# Patient Record
Sex: MALE | Race: WHITE | NOT HISPANIC OR LATINO | Employment: OTHER | URBAN - METROPOLITAN AREA
[De-identification: names, ages, dates, MRNs, and addresses within clinical notes are randomized per-mention and may not be internally consistent; named-entity substitution may affect disease eponyms.]

---

## 2022-07-20 ENCOUNTER — OFFICE VISIT (OUTPATIENT)
Dept: FAMILY MEDICINE CLINIC | Facility: CLINIC | Age: 77
End: 2022-07-20

## 2022-07-20 VITALS
HEART RATE: 67 BPM | OXYGEN SATURATION: 97 % | TEMPERATURE: 98.4 F | DIASTOLIC BLOOD PRESSURE: 70 MMHG | BODY MASS INDEX: 28.88 KG/M2 | HEIGHT: 67 IN | SYSTOLIC BLOOD PRESSURE: 120 MMHG | WEIGHT: 184 LBS

## 2022-07-20 DIAGNOSIS — G89.29 CHRONIC PAIN OF BOTH SHOULDERS: Primary | ICD-10-CM

## 2022-07-20 DIAGNOSIS — M54.50 CHRONIC BILATERAL LOW BACK PAIN WITHOUT SCIATICA: ICD-10-CM

## 2022-07-20 DIAGNOSIS — M25.512 CHRONIC PAIN OF BOTH SHOULDERS: Primary | ICD-10-CM

## 2022-07-20 DIAGNOSIS — M25.511 CHRONIC PAIN OF BOTH SHOULDERS: Primary | ICD-10-CM

## 2022-07-20 DIAGNOSIS — G89.29 CHRONIC BILATERAL LOW BACK PAIN WITHOUT SCIATICA: ICD-10-CM

## 2022-07-20 PROBLEM — E55.9 VITAMIN D DEFICIENCY: Status: ACTIVE | Noted: 2021-06-17

## 2022-07-20 PROCEDURE — 99203 OFFICE O/P NEW LOW 30 MIN: CPT | Performed by: NURSE PRACTITIONER

## 2022-07-20 RX ORDER — MONTELUKAST SODIUM 10 MG/1
TABLET ORAL
COMMUNITY

## 2022-07-20 RX ORDER — FLUTICASONE PROPIONATE AND SALMETEROL 250; 50 UG/1; UG/1
POWDER RESPIRATORY (INHALATION)
COMMUNITY

## 2022-07-20 RX ORDER — DIPHENOXYLATE HYDROCHLORIDE AND ATROPINE SULFATE 2.5; .025 MG/1; MG/1
TABLET ORAL
COMMUNITY

## 2022-07-20 RX ORDER — FLUTICASONE PROPIONATE 50 MCG
SPRAY, SUSPENSION (ML) NASAL
COMMUNITY

## 2022-07-20 RX ORDER — OMEPRAZOLE 20 MG/1
CAPSULE, DELAYED RELEASE ORAL
COMMUNITY
Start: 2022-05-09 | End: 2022-11-14 | Stop reason: SDUPTHER

## 2022-07-20 RX ORDER — MULTIPLE VITAMINS W/ MINERALS TAB 9MG-400MCG
1 TAB ORAL DAILY
COMMUNITY

## 2022-07-20 RX ORDER — ALBUTEROL SULFATE 90 UG/1
AEROSOL, METERED RESPIRATORY (INHALATION) EVERY 4 HOURS
COMMUNITY
End: 2022-11-21

## 2022-07-20 NOTE — PROGRESS NOTES
"Chief Complaint  Establish Care (Pt states he was previously going to [facility].) and Shoulder Pain (Pt states he's had pain in both shoulders for about 4 months. Pt states he's had xrays in the past and was told it is arthritis but that was years ago. )    Subjective        Varghese Smith presents to [facility]  Pt is here today to establish care. He is experiencing shoulder pain bilaterally. He thinks he has arthritis. He would like a referral to see someone. He states he had x-rays in the past. This was was [facility] and in the fall of last year. He was advised that he had arthritis. He states that pain is bad enough to wake him up at night. It is worse on the right, but is starting to get worse on the left. She takes an Advil nighttime and acetaminophen at night to help relieve the pain. Pt states he is very active. He reports that he was playing tennis at the beginning of the year, but is not able to do that anymore. He walks daily.     Objective   Vital Signs:  /70   Pulse 67   Temp 98.4 °F (36.9 °C)   Ht 170.2 cm (67\")   Wt 83.5 kg (184 lb)   SpO2 97%   BMI 28.82 kg/m²   Estimated body mass index is 28.82 kg/m² as calculated from the following:    Height as of this encounter: 170.2 cm (67\").    Weight as of this encounter: 83.5 kg (184 lb).        Physical Exam  Vitals reviewed.   Constitutional:       Appearance: Normal appearance.   HENT:      Nose: Nose normal.      Mouth/Throat:      Mouth: Mucous membranes are moist.   Cardiovascular:      Rate and Rhythm: Normal rate and regular rhythm.      Heart sounds: Normal heart sounds.   Pulmonary:      Effort: Pulmonary effort is normal.      Breath sounds: Normal breath sounds.   Musculoskeletal:         General: No swelling, deformity or signs of injury.      Right shoulder: Tenderness present. Decreased strength.      Left shoulder: Tenderness present. Decreased strength.   Skin:     General: Skin is " warm and dry.   Neurological:      Mental Status: He is alert and oriented to person, place, and time.   Psychiatric:         Mood and Affect: Mood normal.         Behavior: Behavior normal.         Thought Content: Thought content normal.        Result Review :                Assessment and Plan   Diagnoses and all orders for this visit:    1. Chronic pain of both shoulders (Primary)  -     Ambulatory Referral to Rheumatology    2. Chronic bilateral low back pain without sciatica  -     Ambulatory Referral to Rheumatology             Follow Up   Return for Annual.  Patient was given instructions and counseling regarding his condition or for health maintenance advice. Please see specific information pulled into the AVS if appropriate.

## 2022-07-25 ENCOUNTER — OFFICE VISIT (OUTPATIENT)
Dept: FAMILY MEDICINE CLINIC | Facility: CLINIC | Age: 77
End: 2022-07-25

## 2022-07-25 ENCOUNTER — LAB (OUTPATIENT)
Dept: LAB | Facility: HOSPITAL | Age: 77
End: 2022-07-25

## 2022-07-25 VITALS
SYSTOLIC BLOOD PRESSURE: 120 MMHG | DIASTOLIC BLOOD PRESSURE: 72 MMHG | WEIGHT: 186 LBS | BODY MASS INDEX: 29.19 KG/M2 | HEART RATE: 65 BPM | TEMPERATURE: 98.2 F | HEIGHT: 67 IN | OXYGEN SATURATION: 97 %

## 2022-07-25 DIAGNOSIS — Z00.00 MEDICARE ANNUAL WELLNESS VISIT, SUBSEQUENT: Primary | ICD-10-CM

## 2022-07-25 DIAGNOSIS — E61.1 IRON DEFICIENCY: ICD-10-CM

## 2022-07-25 DIAGNOSIS — M25.511 CHRONIC PAIN OF BOTH SHOULDERS: ICD-10-CM

## 2022-07-25 DIAGNOSIS — M25.512 CHRONIC PAIN OF BOTH SHOULDERS: ICD-10-CM

## 2022-07-25 DIAGNOSIS — G89.29 CHRONIC PAIN OF BOTH SHOULDERS: ICD-10-CM

## 2022-07-25 DIAGNOSIS — D64.9 ANEMIA, UNSPECIFIED TYPE: ICD-10-CM

## 2022-07-25 LAB
ALBUMIN SERPL-MCNC: 3.7 G/DL (ref 3.5–5.2)
ALBUMIN/GLOB SERPL: 1.3 G/DL
ALP SERPL-CCNC: 81 U/L (ref 39–117)
ALT SERPL W P-5'-P-CCNC: 10 U/L (ref 1–41)
ANION GAP SERPL CALCULATED.3IONS-SCNC: 10 MMOL/L (ref 5–15)
AST SERPL-CCNC: 20 U/L (ref 1–40)
BASOPHILS # BLD AUTO: 0.04 10*3/MM3 (ref 0–0.2)
BASOPHILS NFR BLD AUTO: 0.8 % (ref 0–1.5)
BILIRUB SERPL-MCNC: 0.3 MG/DL (ref 0–1.2)
BUN SERPL-MCNC: 16 MG/DL (ref 8–23)
BUN/CREAT SERPL: 18.6 (ref 7–25)
CALCIUM SPEC-SCNC: 9.1 MG/DL (ref 8.6–10.5)
CHLORIDE SERPL-SCNC: 109 MMOL/L (ref 98–107)
CHOLEST SERPL-MCNC: 116 MG/DL (ref 0–200)
CO2 SERPL-SCNC: 23 MMOL/L (ref 22–29)
CREAT SERPL-MCNC: 0.86 MG/DL (ref 0.76–1.27)
DEPRECATED RDW RBC AUTO: 48.4 FL (ref 37–54)
EGFRCR SERPLBLD CKD-EPI 2021: 88.6 ML/MIN/1.73
EOSINOPHIL # BLD AUTO: 0.07 10*3/MM3 (ref 0–0.4)
EOSINOPHIL NFR BLD AUTO: 1.4 % (ref 0.3–6.2)
ERYTHROCYTE [DISTWIDTH] IN BLOOD BY AUTOMATED COUNT: 14.9 % (ref 12.3–15.4)
FERRITIN SERPL-MCNC: 44.6 NG/ML (ref 30–400)
GLOBULIN UR ELPH-MCNC: 2.8 GM/DL
GLUCOSE SERPL-MCNC: 87 MG/DL (ref 65–99)
HBA1C MFR BLD: 5.6 % (ref 4.8–5.6)
HCT VFR BLD AUTO: 36.9 % (ref 37.5–51)
HDLC SERPL-MCNC: 47 MG/DL (ref 40–60)
HGB BLD-MCNC: 11.9 G/DL (ref 13–17.7)
IMM GRANULOCYTES # BLD AUTO: 0.02 10*3/MM3 (ref 0–0.05)
IMM GRANULOCYTES NFR BLD AUTO: 0.4 % (ref 0–0.5)
IRON 24H UR-MRATE: 92 MCG/DL (ref 59–158)
IRON SATN MFR SERPL: 23 % (ref 20–50)
LDLC SERPL CALC-MCNC: 56 MG/DL (ref 0–100)
LDLC/HDLC SERPL: 1.2 {RATIO}
LYMPHOCYTES # BLD AUTO: 0.89 10*3/MM3 (ref 0.7–3.1)
LYMPHOCYTES NFR BLD AUTO: 17.2 % (ref 19.6–45.3)
MCH RBC QN AUTO: 29.1 PG (ref 26.6–33)
MCHC RBC AUTO-ENTMCNC: 32.2 G/DL (ref 31.5–35.7)
MCV RBC AUTO: 90.2 FL (ref 79–97)
MONOCYTES # BLD AUTO: 0.64 10*3/MM3 (ref 0.1–0.9)
MONOCYTES NFR BLD AUTO: 12.4 % (ref 5–12)
NEUTROPHILS NFR BLD AUTO: 3.52 10*3/MM3 (ref 1.7–7)
NEUTROPHILS NFR BLD AUTO: 67.8 % (ref 42.7–76)
NRBC BLD AUTO-RTO: 0 /100 WBC (ref 0–0.2)
PLATELET # BLD AUTO: 240 10*3/MM3 (ref 140–450)
PMV BLD AUTO: 10.3 FL (ref 6–12)
POTASSIUM SERPL-SCNC: 4.4 MMOL/L (ref 3.5–5.2)
PROT SERPL-MCNC: 6.5 G/DL (ref 6–8.5)
RBC # BLD AUTO: 4.09 10*6/MM3 (ref 4.14–5.8)
SODIUM SERPL-SCNC: 142 MMOL/L (ref 136–145)
T4 FREE SERPL-MCNC: 1.32 NG/DL (ref 0.93–1.7)
TIBC SERPL-MCNC: 396 MCG/DL (ref 298–536)
TRANSFERRIN SERPL-MCNC: 266 MG/DL (ref 200–360)
TRIGL SERPL-MCNC: 62 MG/DL (ref 0–150)
TSH SERPL DL<=0.05 MIU/L-ACNC: 4.22 UIU/ML (ref 0.27–4.2)
VLDLC SERPL-MCNC: 13 MG/DL (ref 5–40)
WBC NRBC COR # BLD: 5.18 10*3/MM3 (ref 3.4–10.8)

## 2022-07-25 PROCEDURE — 82728 ASSAY OF FERRITIN: CPT | Performed by: NURSE PRACTITIONER

## 2022-07-25 PROCEDURE — 83540 ASSAY OF IRON: CPT | Performed by: NURSE PRACTITIONER

## 2022-07-25 PROCEDURE — 80061 LIPID PANEL: CPT | Performed by: NURSE PRACTITIONER

## 2022-07-25 PROCEDURE — 83036 HEMOGLOBIN GLYCOSYLATED A1C: CPT | Performed by: NURSE PRACTITIONER

## 2022-07-25 PROCEDURE — 80053 COMPREHEN METABOLIC PANEL: CPT | Performed by: NURSE PRACTITIONER

## 2022-07-25 PROCEDURE — 84439 ASSAY OF FREE THYROXINE: CPT | Performed by: NURSE PRACTITIONER

## 2022-07-25 PROCEDURE — 84466 ASSAY OF TRANSFERRIN: CPT | Performed by: NURSE PRACTITIONER

## 2022-07-25 PROCEDURE — 84443 ASSAY THYROID STIM HORMONE: CPT | Performed by: NURSE PRACTITIONER

## 2022-07-25 PROCEDURE — 1170F FXNL STATUS ASSESSED: CPT | Performed by: NURSE PRACTITIONER

## 2022-07-25 PROCEDURE — 85025 COMPLETE CBC W/AUTO DIFF WBC: CPT | Performed by: NURSE PRACTITIONER

## 2022-07-25 PROCEDURE — 1125F AMNT PAIN NOTED PAIN PRSNT: CPT | Performed by: NURSE PRACTITIONER

## 2022-07-25 PROCEDURE — G0439 PPPS, SUBSEQ VISIT: HCPCS | Performed by: NURSE PRACTITIONER

## 2022-07-25 PROCEDURE — 1159F MED LIST DOCD IN RCRD: CPT | Performed by: NURSE PRACTITIONER

## 2022-07-25 NOTE — PROGRESS NOTES
The ABCs of the Annual Wellness Visit  Subsequent Medicare Wellness Visit    Chief Complaint   Patient presents with   • Medicare Wellness-subsequent     Pt states he is fasting today.    • Pain     Pt states he is still having a lot of shoulder pain. He states it hurts worse at night.        Subjective    History of Present Illness:  Varghese Smith is a 77 y.o. male who presents for a Subsequent Medicare Wellness Visit.    The following portions of the patient's history were reviewed and   updated as appropriate: past family history, past social history and past surgical history.    Compared to one year ago, the patient feels his physical   health is worse.    Compared to one year ago, the patient feels his mental   health is the same.    Recent Hospitalizations:  He was not admitted to the hospital during the last year.       Current Medical Providers:  Patient Care Team:  Flora Hobbs APRN as PCP - General (Nurse Practitioner)    Outpatient Medications Prior to Visit   Medication Sig Dispense Refill   • albuterol sulfate  (90 Base) MCG/ACT inhaler Every 4 (Four) Hours.     • Ferrous Sulfate (IRON PO) 50 mg.     • fluticasone (FLONASE) 50 MCG/ACT nasal spray Flonase Allergy Relief 50 mcg/actuation nasal spray,suspension   2 sprays in each nostril once daily     • Fluticasone-Salmeterol (ADVAIR) 250-50 MCG/ACT DISKUS Advair Diskus 250 mcg-50 mcg/dose powder for inhalation   INHALE ONE PUFF BY MOUTH TWICE A DAY     • montelukast (SINGULAIR) 10 MG tablet montelukast 10 mg tablet   TAKE ONE TABLET BY MOUTH DAILY     • multivitamin (THERAGRAN) tablet tablet Multiple Vitamin capsule   Daily     • multivitamin with minerals tablet tablet Take 1 tablet by mouth Daily.     • omeprazole (priLOSEC) 20 MG capsule        No facility-administered medications prior to visit.       No opioid medication identified on active medication list. I have reviewed chart for other potential  high risk medication/s and harmful drug  "interactions in the elderly.          Aspirin is not on active medication list.  Aspirin use is not indicated based on review of current medical condition/s. Risk of harm outweighs potential benefits.  .    Patient Active Problem List   Diagnosis   • Anemia   • Asthma   • Chronic rhinitis   • Vitamin D deficiency   • Diverticulosis   • GERD (gastroesophageal reflux disease)   • Low back pain   • Prostate hypertrophy   • Raised prostate specific antigen     Advance Care Planning  Advance Directive is not on file.  ACP discussion was held with the patient during this visit. Patient has an advance directive (not in EMR), copy requested.          Objective    Vitals:    07/25/22 1008   BP: 120/72   Pulse: 65   Temp: 98.2 °F (36.8 °C)   SpO2: 97%   Weight: 84.4 kg (186 lb)   Height: 170.2 cm (67\")   PainSc:   3   PainLoc: Shoulder     Estimated body mass index is 29.13 kg/m² as calculated from the following:    Height as of this encounter: 170.2 cm (67\").    Weight as of this encounter: 84.4 kg (186 lb).    BMI is >= 25 and <30. (Overweight) The following options were offered after discussion;: none (medical contraindication)      Does the patient have evidence of cognitive impairment? No    Physical Exam  Vitals reviewed.   Constitutional:       Appearance: Normal appearance.   HENT:      Right Ear: Tympanic membrane, ear canal and external ear normal.      Left Ear: Tympanic membrane, ear canal and external ear normal.      Nose: Nose normal.      Mouth/Throat:      Mouth: Mucous membranes are moist.   Eyes:      Extraocular Movements: Extraocular movements intact.      Conjunctiva/sclera: Conjunctivae normal.      Pupils: Pupils are equal, round, and reactive to light.   Cardiovascular:      Rate and Rhythm: Normal rate and regular rhythm.      Pulses: Normal pulses.      Heart sounds: Normal heart sounds.   Pulmonary:      Effort: Pulmonary effort is normal.      Breath sounds: Normal breath sounds.   Abdominal:      " General: Abdomen is flat. Bowel sounds are normal.      Palpations: Abdomen is soft.   Musculoskeletal:         General: Tenderness present.      Right shoulder: Bony tenderness present.      Left shoulder: Bony tenderness present.      Cervical back: Normal range of motion and neck supple.      Comments: Tenderness in joints with palpation.    Skin:     General: Skin is warm.   Neurological:      Mental Status: He is alert and oriented to person, place, and time.   Psychiatric:         Mood and Affect: Mood normal.         Behavior: Behavior normal.         Thought Content: Thought content normal.                 HEALTH RISK ASSESSMENT    Smoking Status:  Social History     Tobacco Use   Smoking Status Never Smoker   Smokeless Tobacco Never Used     Alcohol Consumption:  Social History     Substance and Sexual Activity   Alcohol Use Never     Fall Risk Screen:    STEADI Fall Risk Assessment was completed, and patient is at MODERATE risk for falls. Assessment completed on:7/25/2022    Depression Screening:  PHQ-2/PHQ-9 Depression Screening 7/25/2022   Little Interest or Pleasure in Doing Things 0-->not at all   Feeling Down, Depressed or Hopeless 0-->not at all   PHQ-9: Brief Depression Severity Measure Score 0       Health Habits and Functional and Cognitive Screening:  Functional & Cognitive Status 7/25/2022   Do you have difficulty preparing food and eating? No   Do you have difficulty bathing yourself, getting dressed or grooming yourself? No   Do you have difficulty using the toilet? No   Do you have difficulty moving around from place to place? No   Do you have trouble with steps or getting out of a bed or a chair? No   Current Diet Well Balanced Diet   Dental Exam Not up to date   Eye Exam Up to date   Exercise (times per week) 4 times per week   Current Exercises Include Walking   Do you need help using the phone?  No   Are you deaf or do you have serious difficulty hearing?  Yes   Do you need help with  transportation? No   Do you need help shopping? No   Do you need help preparing meals?  No   Do you need help with housework?  No   Do you need help with laundry? No   Do you need help taking your medications? No   Do you need help managing money? No   Do you ever drive or ride in a car without wearing a seat belt? No   Have you felt unusual stress, anger or loneliness in the last month? No   Who do you live with? Spouse   If you need help, do you have trouble finding someone available to you? No   Have you been bothered in the last four weeks by sexual problems? No   Do you have difficulty concentrating, remembering or making decisions? No       Age-appropriate Screening Schedule:  Refer to the list below for future screening recommendations based on patient's age, sex and/or medical conditions. Orders for these recommended tests are listed in the plan section. The patient has been provided with a written plan.    Health Maintenance   Topic Date Due   • ZOSTER VACCINE (2 of 3) 04/16/2013   • INFLUENZA VACCINE  10/01/2022   • TDAP/TD VACCINES (3 - Td or Tdap) 09/17/2027              Assessment & Plan   CMS Preventative Services Quick Reference  Risk Factors Identified During Encounter  Chronic Pain   Fall Risk-High or Moderate  Immunizations Discussed/Encouraged (specific Immunizations; Up to date on all vaccines. Zoster and covid done at outside pharmacy.  The above risks/problems have been discussed with the patient.  Follow up actions/plans if indicated are seen below in the Assessment/Plan Section.  Pertinent information has been shared with the patient in the After Visit Summary.    Diagnoses and all orders for this visit:    1. Medicare annual wellness visit, subsequent (Primary)  -     Comprehensive Metabolic Panel; Future  -     Lipid Panel; Future  -     Hemoglobin A1c; Future  -     TSH Rfx On Abnormal To Free T4; Future  -     Comprehensive Metabolic Panel  -     Lipid Panel  -     Hemoglobin A1c  -      TSH Rfx On Abnormal To Free T4    2. Iron deficiency    3. Chronic pain of both shoulders    4. Anemia, unspecified type  -     CBC Auto Differential; Future  -     Ferritin; Future  -     Iron Profile; Future  -     CBC Auto Differential  -     Ferritin  -     Iron Profile      Follow Up:   Return in about 1 year (around 7/25/2023) for Medicare Wellness, Annual.     An After Visit Summary and PPPS were made available to the patient.

## 2022-07-26 ENCOUNTER — TELEPHONE (OUTPATIENT)
Dept: FAMILY MEDICINE CLINIC | Facility: CLINIC | Age: 77
End: 2022-07-26

## 2022-07-26 NOTE — TELEPHONE ENCOUNTER
Contacted patient, he was unable to hear. He will call office back later to discuss his results    I have your lab results. Your cholesterol labs are in normal range, iron studies are in normal range. Your hemoglobin and hematocrit are a little low. If I remember correctly, you are taking iron every other day. I would continue the iron supplement and multivitamin. Your thyroid lab was slightly elevated. It was not elevated enough that I would want to start medication, but I think we need to re-check in about 3 months. If you have any questions, please let me know.   Hub can relay and document.

## 2022-11-14 NOTE — TELEPHONE ENCOUNTER
Caller: Varghese Smith    Relationship: Self    Best call back number: [phone] OK TO LEAVE DETAILED MESSAGE IF NO ANSWER    Requested Prescriptions:   Requested Prescriptions     Pending Prescriptions Disp Refills   • omeprazole (priLOSEC) 20 MG capsule        Pharmacy where request should be sent: [facility] [id number] - [facility] [address] [facility] [phone] [facility] [phone]      Additional details provided by patient: PLEASE REFILL AS 90 DAY SUPPLY    Does the patient have less than a 3 day supply:  [x] Yes  [] No    Aki Stevens Rep   11/14/22 15:40 EST

## 2022-11-15 RX ORDER — OMEPRAZOLE 20 MG/1
20 CAPSULE, DELAYED RELEASE ORAL DAILY
Qty: 90 CAPSULE | Refills: 0 | Status: SHIPPED | OUTPATIENT
Start: 2022-11-15 | End: 2023-02-16

## 2022-11-21 ENCOUNTER — OFFICE VISIT (OUTPATIENT)
Dept: FAMILY MEDICINE CLINIC | Facility: CLINIC | Age: 77
End: 2022-11-21

## 2022-11-21 ENCOUNTER — LAB (OUTPATIENT)
Dept: LAB | Facility: HOSPITAL | Age: 77
End: 2022-11-21

## 2022-11-21 VITALS
HEART RATE: 74 BPM | HEIGHT: 67 IN | OXYGEN SATURATION: 98 % | BODY MASS INDEX: 29 KG/M2 | DIASTOLIC BLOOD PRESSURE: 80 MMHG | SYSTOLIC BLOOD PRESSURE: 120 MMHG | WEIGHT: 184.8 LBS

## 2022-11-21 DIAGNOSIS — M25.561 ARTHRALGIA OF BOTH KNEES: ICD-10-CM

## 2022-11-21 DIAGNOSIS — M25.562 ARTHRALGIA OF BOTH KNEES: ICD-10-CM

## 2022-11-21 DIAGNOSIS — M25.511 CHRONIC RIGHT SHOULDER PAIN: Primary | ICD-10-CM

## 2022-11-21 DIAGNOSIS — G89.29 CHRONIC RIGHT SHOULDER PAIN: Primary | ICD-10-CM
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
## 2022-11-21 DIAGNOSIS — R41.89 COGNITIVE DECLINE: ICD-10-CM

## 2022-11-21 PROBLEM — M54.12 CERVICAL RADICULOPATHY: Status: ACTIVE | Noted: 2022-09-02

## 2022-11-21 PROBLEM — M25.559 ARTHRALGIA OF HIP: Status: ACTIVE | Noted: 2022-06-27

## 2022-11-21 PROCEDURE — 99213 OFFICE O/P EST LOW 20 MIN: CPT | Performed by: NURSE PRACTITIONER

## 2022-11-21 PROCEDURE — 36415 COLL VENOUS BLD VENIPUNCTURE: CPT

## 2022-11-21 PROCEDURE — 86618 LYME DISEASE ANTIBODY: CPT

## 2022-11-21 NOTE — PROGRESS NOTES
Answers for HPI/ROS submitted by the patient on 11/18/2022  What is the primary reason for your visit?: Other  Please describe your symptoms.: I'm having unusual joint pain.  Have you had these symptoms before?: Yes  How long have you been having these symptoms?: Greater than 2 weeks  Please list any medications you are currently taking for this condition.: Ibuprofen,acetaminophen, cbd gummies  Please describe any probable cause for these symptoms. : I had a tick bite about two months ago.    Chief Complaint  Insect Bite (Pt states he got bit by a tick near his groin)    Subjective        Varghese Smith presents to [facility]  History of Present Illness  Pt is here today for concerns of joint pain in wrists and knees. He is also experiencing itching on the abdomen and chest. No new soaps, detergents, medications, etc. He recently had pneumonia, but is off of medications from that and has no new medications. He does reports a tick bite around end of September. He reports he did not have a bullseye rash at the site of the bite.     Pt is experiencing chronic pain of the right shoulder. He states it is worse at night. Pain is sharp and radiates down to right hand. He states sometimes it is bad enough that he can grab the covers and experiences a sharp pain in his hand. He gets a burning feeling in his wrist and hand. He has difficulty raising arm above a 90 degree level. He is currently undergoing PT and reports that is her is getting relief it is minimal. Pt has had x-ray done at another facility earlier this year. X-ray result was normal.     Pt has concerns of early stages of dementia. He and wife state that he has difficulty finishing statements. He reports difficult with word recall. He is also experiencing some short term memory concerns. He states that when talking, sometimes he cannot finish sentence. Wife states that sometimes he will repeat things that she said, not knowing that  "she said them.    Objective   Vital Signs:  /80   Pulse 74   Ht 170.2 cm (67.01\")   Wt 83.8 kg (184 lb 12.8 oz)   SpO2 98%   BMI 28.94 kg/m²   Estimated body mass index is 28.94 kg/m² as calculated from the following:    Height as of this encounter: 170.2 cm (67.01\").    Weight as of this encounter: 83.8 kg (184 lb 12.8 oz).        Physical Exam  Vitals reviewed.   Constitutional:       Appearance: Normal appearance.   HENT:      Nose: Nose normal.      Mouth/Throat:      Mouth: Mucous membranes are moist.      Pharynx: Oropharynx is clear.   Eyes:      Conjunctiva/sclera: Conjunctivae normal.   Cardiovascular:      Rate and Rhythm: Normal rate and regular rhythm.      Heart sounds: Murmur heard.   Pulmonary:      Effort: Pulmonary effort is normal.      Breath sounds: Normal breath sounds.   Abdominal:      General: Bowel sounds are normal.   Musculoskeletal:         General: Normal range of motion.   Skin:     General: Skin is warm.   Neurological:      Mental Status: He is alert and oriented to person, place, and time.   Psychiatric:         Mood and Affect: Mood normal.         Behavior: Behavior normal.         Thought Content: Thought content normal.        Result Review :                Assessment and Plan   Diagnoses and all orders for this visit:    1. Chronic right shoulder pain (Primary)  -     MRI Shoulder Right Without Contrast; Future    2. Arthralgia of both knees  -     Lyme Disease Total Antibody With Reflex to Immunoassay; Future    3. Tick bite, unspecified site, initial encounter  -     Lyme Disease Total Antibody With Reflex to Immunoassay; Future    4. Cognitive decline  -     Ambulatory Referral to Neurology             Follow Up   No follow-ups on file.  Patient was given instructions and counseling regarding his condition or for health maintenance advice. Please see specific information pulled into the AVS if appropriate.       "

## 2022-11-23 ENCOUNTER — TELEPHONE (OUTPATIENT)
Dept: FAMILY MEDICINE CLINIC | Facility: CLINIC | Age: 77
End: 2022-11-23

## 2022-11-23 LAB — B BURGDOR IGG+IGM SER QL IA: NEGATIVE

## 2022-11-23 NOTE — TELEPHONE ENCOUNTER
Caller: Varghese Smith    Relationship: Self    Best call back number: [phone]    What orders are you requesting (i.e. lab or imaging): ORDER FOR MRI RIGHT SHOULDER    In what timeframe would the patient need to come in: ASAP    Where will you receive your lab/imaging services: [facility]    Additional notes:

## 2022-11-23 NOTE — TELEPHONE ENCOUNTER
Patient's referral has been forwarded to [facility], I advised him we still need authorization from insurance before this can be scheduled. He verbalized understanding and agreed

## 2022-12-06 ENCOUNTER — TELEPHONE (OUTPATIENT)
Dept: FAMILY MEDICINE CLINIC | Facility: CLINIC | Age: 77
End: 2022-12-06

## 2022-12-06 NOTE — TELEPHONE ENCOUNTER
Caller: Varghese Smith    Relationship: Self    Best call back number: [phone]    What orders are you requesting (i.e. lab or imaging): MRI ON NECK     In what timeframe would the patient need to come in: 12/19/22    Where will you receive your lab/imaging services: [facility]    PHYSICAL THERAPY SAID HE NEEDS AND MRI ON THE NECK TOO AND HIS SHOULDER.    PATIENT HAS APPOINTMENT ON SHOULDER MRI AT [facility] ON 12/19/22

## 2022-12-07 DIAGNOSIS — G89.29 CHRONIC PAIN OF BOTH SHOULDERS: Primary | ICD-10-CM

## 2022-12-07 DIAGNOSIS — M25.512 CHRONIC PAIN OF BOTH SHOULDERS: Primary | ICD-10-CM

## 2022-12-07 DIAGNOSIS — M25.511 CHRONIC PAIN OF BOTH SHOULDERS: Primary | ICD-10-CM

## 2022-12-12 ENCOUNTER — TELEPHONE (OUTPATIENT)
Dept: FAMILY MEDICINE CLINIC | Facility: CLINIC | Age: 77
End: 2022-12-12

## 2022-12-12 NOTE — TELEPHONE ENCOUNTER
Contacted patient to discuss further, advised him that this has not been authorized with insurance yet and once completed we can forward his referral to [facility].

## 2022-12-12 NOTE — TELEPHONE ENCOUNTER
Caller: Varghese Smith    Relationship: Self    Best call back number: [phone]    What is the best time to reach you: ANYTIME    Who are you requesting to speak with (clinical staff, provider,  specific staff member): CLINICAL    What was the call regarding: PATIENT IS NEEDING A UPDATE TO HIS REFERRAL OF A MRI OF HIS NECK. PLEASE REACH OUT AND ADVISE.    Do you require a callback: YES

## 2022-12-15 NOTE — TELEPHONE ENCOUNTER
Attempted to contact, no answer left message asking for return call    It appears MRI has been cancelled?

## 2022-12-27 ENCOUNTER — TELEPHONE (OUTPATIENT)
Dept: FAMILY MEDICINE CLINIC | Facility: CLINIC | Age: 77
End: 2022-12-27

## 2022-12-27 NOTE — TELEPHONE ENCOUNTER
Caller: Varghese Smith    Relationship: Self    Best call back number: [phone]    PATIENT CALLING IN TO REQUEST AN ORDER FOR A REPEAT ON HIS LYME DISEASE TEST.    PLEASE CALL WHEN THE ORDER IS IN.

## 2022-12-28 NOTE — TELEPHONE ENCOUNTER
Patient reports that he has not received any further bites, he was advised to complete follow testing within the next 3 weeks after receiving negative results to completely rule out lyme disease    Denies any symptoms or changes since last evaluation
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
## 2022-12-30 ENCOUNTER — TELEPHONE (OUTPATIENT)
Dept: FAMILY MEDICINE CLINIC | Facility: CLINIC | Age: 77
End: 2022-12-30

## 2022-12-30 ENCOUNTER — LAB (OUTPATIENT)
Dept: LAB | Facility: HOSPITAL | Age: 77
End: 2022-12-30
Payer: MEDICARE
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
## 2022-12-30 PROCEDURE — 36415 COLL VENOUS BLD VENIPUNCTURE: CPT

## 2022-12-30 PROCEDURE — 86618 LYME DISEASE ANTIBODY: CPT

## 2022-12-30 NOTE — TELEPHONE ENCOUNTER
Caller: Varghese Smith    Relationship: Self    Best call back number: [phone]    Caller requesting test results: VARGHESE    What test was performed: MRI    When was the test performed: 12/27/22    Where was the test performed: [facility]    Additional notes: PATIENT WOULD LIKE A FOLLOW UP ABOUT HIS RESULTS.

## 2023-01-01 LAB — B BURGDOR IGG+IGM SER QL IA: NEGATIVE

## 2023-01-04 ENCOUNTER — TELEPHONE (OUTPATIENT)
Dept: FAMILY MEDICINE CLINIC | Facility: CLINIC | Age: 78
End: 2023-01-04
Payer: MEDICARE

## 2023-01-04 DIAGNOSIS — S46.812A PARTIAL TEAR OF LEFT SUBSCAPULARIS TENDON, INITIAL ENCOUNTER: Primary | ICD-10-CM

## 2023-01-04 NOTE — TELEPHONE ENCOUNTER
Caller: Varghese Smtih    Relationship: Self    Best call back number: [phone]    What is the medical concern/diagnosis: SHOULDER PAIN     What specialty or service is being requested: ORTHOPEDIC DR    What is the provider, practice or medical service name: [facility]    What is the office location: [address] [place] [address]    What is the office phone number: [phone]      Any additional details: VARGHESE IS LEAVING FOR [place] NEXT WEEK FOR 3 MONTHS AND WOULD LIKE YOU TO SEND THE MRI RESULTS TO HIS DR IN [place]

## 2023-01-09 ENCOUNTER — APPOINTMENT (OUTPATIENT)
Dept: MRI IMAGING | Facility: HOSPITAL | Age: 78
End: 2023-01-09

## 2023-02-16 RX ORDER — OMEPRAZOLE 20 MG/1
CAPSULE, DELAYED RELEASE ORAL
Qty: 90 CAPSULE | Refills: 1 | Status: SHIPPED | OUTPATIENT
Start: 2023-02-16

## 2023-02-16 NOTE — TELEPHONE ENCOUNTER
Rx Refill Note  Requested Prescriptions     Pending Prescriptions Disp Refills   • omeprazole (priLOSEC) 20 MG capsule [Pharmacy Med Name: OMEPRAZOLE 20MG CAPSULES] 90 capsule 0     Sig: TAKE 1 CAPSULE BY MOUTH EVERY DAY      Last office visit with prescribing clinician: 11/21/2022   Last telemedicine visit with prescribing clinician: Visit date not found   Next office visit with prescribing clinician: Visit date not found                         Would you like a call back once the refill request has been completed: [] Yes [] No    If the office needs to give you a call back, can they leave a voicemail: [] Yes [] No    April BRAD Miranda  02/16/23, 13:37 EST

## 2023-08-15 NOTE — TELEPHONE ENCOUNTER
Rx Refill Note  Requested Prescriptions     Pending Prescriptions Disp Refills    omeprazole (priLOSEC) 20 MG capsule [Pharmacy Med Name: OMEPRAZOLE 20MG CAPSULES] 90 capsule 1     Sig: TAKE 1 CAPSULE BY MOUTH EVERY DAY      Last office visit with prescribing clinician: 11/21/2022   Last telemedicine visit with prescribing clinician: Visit date not found   Next office visit with prescribing clinician: Visit date not found                         Would you like a call back once the refill request has been completed: [] Yes [] No    If the office needs to give you a call back, can they leave a voicemail: [] Yes [] No    April BRAD Miranda  08/15/23, 14:24 EDT

## 2023-08-16 RX ORDER — OMEPRAZOLE 20 MG/1
CAPSULE, DELAYED RELEASE ORAL
Qty: 90 CAPSULE | Refills: 0 | Status: SHIPPED | OUTPATIENT
Start: 2023-08-16

## 2023-08-16 NOTE — TELEPHONE ENCOUNTER
Rx Refill Note  Requested Prescriptions     Pending Prescriptions Disp Refills    omeprazole (priLOSEC) 20 MG capsule [Pharmacy Med Name: OMEPRAZOLE 20MG CAPSULES] 90 capsule 1     Sig: TAKE 1 CAPSULE BY MOUTH EVERY DAY      Last office visit with prescribing clinician: 11/21/2022   Last telemedicine visit with prescribing clinician: Visit date not found   Next office visit with prescribing clinician: Visit date not found                         Would you like a call back once the refill request has been completed: [] Yes [] No    If the office needs to give you a call back, can they leave a voicemail: [] Yes [] No    Krunal Barrett MA  08/16/23, 08:24 EDT

## 2023-09-20 ENCOUNTER — OFFICE VISIT (OUTPATIENT)
Dept: NEUROLOGY | Facility: CLINIC | Age: 78
End: 2023-09-20
Payer: MEDICARE

## 2023-09-20 VITALS
DIASTOLIC BLOOD PRESSURE: 76 MMHG | WEIGHT: 181.2 LBS | HEIGHT: 67 IN | OXYGEN SATURATION: 95 % | BODY MASS INDEX: 28.44 KG/M2 | SYSTOLIC BLOOD PRESSURE: 116 MMHG | HEART RATE: 84 BPM

## 2023-09-20 DIAGNOSIS — R41.3 MEMORY LOSS: Primary | ICD-10-CM

## 2023-09-20 DIAGNOSIS — F41.9 ANXIETY AND DEPRESSION: ICD-10-CM

## 2023-09-20 DIAGNOSIS — F32.A ANXIETY AND DEPRESSION: ICD-10-CM

## 2023-09-20 PROCEDURE — 99214 OFFICE O/P EST MOD 30 MIN: CPT | Performed by: NURSE PRACTITIONER

## 2023-09-20 PROCEDURE — 1159F MED LIST DOCD IN RCRD: CPT | Performed by: NURSE PRACTITIONER

## 2023-09-20 PROCEDURE — 1160F RVW MEDS BY RX/DR IN RCRD: CPT | Performed by: NURSE PRACTITIONER

## 2023-09-20 NOTE — PROGRESS NOTES
Neuro Office Visit      Encounter Date: 2023   Patient Name: Varghese Smith  : 1945   MRN: [id number]   PCP:  Flora Griffith APRN     Chief Complaint:    Chief Complaint   Patient presents with    Memory Loss       History of Present Illness: Varghese Smith is a 78 y.o. male who is here today in Neurology for memory loss.    Last visit with me on 2023, started on Zoloft.  MMSE 27/30.    Zoloft was not helpful and he stopped taking it.    Continues to have difficulty recalling names of people he has known for a long time.    Feels like a word or name is inside his brain but he just cannot get it out.    Feels very embarrassed when he cannot remember the names of people he has known for many years.    Questions if there could be a component of ADD.  He is felt for many years that he could have ADD.    Subjective      Past Medical History:   Past Medical History:   Diagnosis Date    Anemia     Arthritis     Asthma     Diverticulitis     Erectile dysfunction     GERD (gastroesophageal reflux disease)     Headache     History of medical problems     HL (hearing loss) 2020    Kidney stone     Memory loss        Past Surgical History:   Past Surgical History:   Procedure Laterality Date    HERNIA REPAIR      JOINT REPLACEMENT      TOTAL HIP ARTHROPLASTY Right     VASECTOMY         Family History:   Family History   Problem Relation Age of Onset    Arthritis Mother     Dementia Mother        Social History:   Social History     Socioeconomic History    Marital status:    Tobacco Use    Smoking status: Never    Smokeless tobacco: Never   Substance and Sexual Activity    Alcohol use: Never    Drug use: Never    Sexual activity: Not Currently     Partners: Female       Medications:     Current Outpatient Medications:     albuterol sulfate  (90 Base) MCG/ACT inhaler, , Disp: , Rfl:     Ferrous Sulfate (IRON PO), 50 mg., Disp: , Rfl:     fluticasone (FLONASE) 50 MCG/ACT nasal spray,  Flonase Allergy Relief 50 mcg/actuation nasal spray,suspension  2 sprays in each nostril once daily, Disp: , Rfl:     Fluticasone-Salmeterol (ADVAIR) 250-50 MCG/ACT DISKUS, Advair Diskus 250 mcg-50 mcg/dose powder for inhalation  INHALE ONE PUFF BY MOUTH TWICE A DAY, Disp: , Rfl:     montelukast (SINGULAIR) 10 MG tablet, montelukast 10 mg tablet  TAKE ONE TABLET BY MOUTH DAILY, Disp: , Rfl:     omeprazole (priLOSEC) 20 MG capsule, TAKE 1 CAPSULE BY MOUTH EVERY DAY, Disp: 90 capsule, Rfl: 0    Allergies:   No Known Allergies    PHQ-9 Total Score:     [facility], and patient is at HIGH risk for falls. Assessment completed on:9/20/2023    Objective     Physical Exam:   Physical Exam  Vitals reviewed.   Eyes:      Extraocular Movements: EOM normal.      Pupils: Pupils are equal, round, and reactive to light.   Neurological:      Mental Status: He is oriented to person, place, and time.      Gait: Gait is intact.   Psychiatric:         Speech: Speech normal.       Neurologic Exam     Mental Status   Oriented to person, place, and time.   Attention: normal. Concentration: normal.   Speech: speech is normal   Level of consciousness: alert  Knowledge: good.     Cranial Nerves     CN II   Visual fields full to confrontation.     CN III, IV, VI   Pupils are equal, round, and reactive to light.  Extraocular motions are normal.   CN III: no CN III palsy  CN VI: no CN VI palsy    CN V   Facial sensation intact.     CN VII   Facial expression full, symmetric.     CN VIII   CN VIII normal.     CN IX, X   CN IX normal.   CN X normal.     CN XI   CN XI normal.     CN XII   CN XII normal.     Motor Exam     Strength   Right neck flexion: 5/5  Left neck flexion: 5/5  Right neck extension: 5/5  Left neck extension: 5/5  Right deltoid: 5/5  Left deltoid: 5/5  Right biceps: 5/5  Left biceps: 5/5  Right triceps: 5/5  Left triceps: 5/5  Right wrist flexion: 5/5  Left wrist flexion: 5/5  Right wrist extension:  "5/5  Left wrist extension: 5/5  Right interossei: 5/5  Left interossei: 5/5  Right abdominals: 5/5  Left abdominals: 5/5  Right iliopsoas: 5/5  Left iliopsoas: 5/5  Right quadriceps: 5/5  Left quadriceps: 5/5  Right hamstrin/5  Left hamstrin/5  Right glutei: 5/5  Left glutei: 5/5  Right anterior tibial: 5/5  Left anterior tibial: 5/5  Right posterior tibial: 5/5  Left posterior tibial: 5/5  Right peroneal: 5/5  Left peroneal: 5/5  Right gastroc: 5/5  Left gastroc: 5/5    Sensory Exam   Light touch normal.     Gait, Coordination, and Reflexes     Gait  Gait: normal     Vital Signs:   Vitals:    23 1326   BP: 116/76   Pulse: 84   SpO2: 95%   Weight: 82.2 kg (181 lb 3.2 oz)   Height: 170.2 cm (67.01\")     Body mass index is 28.37 kg/m².     Results:   Imaging:   No Images in the past 120 days found..     Labs:    No results found for: CMP, PROTEIN, ANTIMOGAB, RSCYYO0BCNA, JCVRESULT, QUANTTBGOLD, CBCDIF, IGGALBSER     Assessment / Plan      Assessment/Plan:   Diagnoses and all orders for this visit:    1. Memory loss (Primary)  -     MRI Brain With & Without Contrast; Future    2. Anxiety and depression  -     MRI Brain With & Without Contrast; Future  -     Ambulatory Referral to Psychiatry           Patient Education:     Reviewed medications, potential side effects and signs and symptoms to report. Discussed risk versus benefits of treatment plan with patient and/or family-including medications, labs and radiology that may be ordered. Addressed questions and concerns during visit. Patient and/or family verbalized understanding and agree with plan. Instructed to call the office with any questions and report to ER with any life-threatening symptoms.     Follow Up:   No follow-ups on file.    I spent 30  minutes face to face with the patient and family. I personally spent 50 percent of this time counseling and discussing diagnosis, diagnostic testing, driving, treatment options, and management . "       During this visit the following were done:  Labs Reviewed []    Labs Ordered []    Radiology Reports Reviewed []    Radiology Ordered [x]    PCP Records Reviewed []    Referring Provider Records Reviewed []    ER Records Reviewed []    Hospital Records Reviewed []    History Obtained From Family []    Radiology Images Reviewed []    Other Reviewed []    Records Requested []      [facility]  [address]  [place] [address] [phone]

## 2023-09-22 ENCOUNTER — PATIENT ROUNDING (BHMG ONLY) (OUTPATIENT)
Dept: NEUROLOGY | Facility: CLINIC | Age: 78
End: 2023-09-22
Payer: MEDICARE

## 2023-09-22 NOTE — PROGRESS NOTES
A My-Chart message has been sent to the patient for PATIENT ROUNDING with [facility]

## 2023-10-10 ENCOUNTER — TELEPHONE (OUTPATIENT)
Age: 78
End: 2023-10-10
Payer: MEDICARE

## 2023-10-10 DIAGNOSIS — H91.90 HEARING LOSS, UNSPECIFIED HEARING LOSS TYPE, UNSPECIFIED LATERALITY: Primary | ICD-10-CM

## 2023-10-10 NOTE — TELEPHONE ENCOUNTER
Caller: Varghese Smith    Relationship: Self    Best call back number: [phone]    What is the medical concern/diagnosis: HARD OF HEARING    What specialty or service is being requested: AUDIOLOGY    What is the provider, practice or medical service name: [facility]    What is the office location: [place]    Any additional details: PATIENT WOULD LIKE A REFERRAL TO HELP WITH HIS HARD OF HEARING. PLEASE ADVISE AND CALL PATIENT BACK

## 2023-10-25 ENCOUNTER — OFFICE VISIT (OUTPATIENT)
Age: 78
End: 2023-10-25
Payer: MEDICARE

## 2023-10-25 VITALS
BODY MASS INDEX: 29.43 KG/M2 | HEART RATE: 82 BPM | SYSTOLIC BLOOD PRESSURE: 118 MMHG | WEIGHT: 187.5 LBS | HEIGHT: 67 IN | OXYGEN SATURATION: 93 % | DIASTOLIC BLOOD PRESSURE: 76 MMHG

## 2023-10-25 DIAGNOSIS — F33.1 MAJOR DEPRESSIVE DISORDER, RECURRENT EPISODE, MODERATE DEGREE: Primary | ICD-10-CM

## 2023-10-25 RX ORDER — BREXPIPRAZOLE 1 MG/1
1 TABLET ORAL DAILY
Qty: 30 TABLET | Refills: 0 | Status: SHIPPED | OUTPATIENT
Start: 2023-10-25

## 2023-10-25 RX ORDER — BREXPIPRAZOLE 1 MG/1
1 TABLET ORAL DAILY
Qty: 21 TABLET | Refills: 0 | COMMUNITY
Start: 2023-10-25 | End: 2023-10-30

## 2023-10-25 RX ORDER — BREXPIPRAZOLE 0.5 MG/1
0.5 TABLET ORAL DAILY
Qty: 7 TABLET | Refills: 0 | COMMUNITY
Start: 2023-10-25 | End: 2023-10-30

## 2023-10-25 NOTE — PROGRESS NOTES
"    New Patient Office Visit      Date: 10/25/2023  Patient Name: Varghese Smith  : 1945   MRN: [id number]     Referring Provider: Flora Griffith APRN    Chief Complaint:      ICD-10-CM ICD-9-CM   1. Major depressive disorder, recurrent episode, moderate degree  F33.1 296.32        History of Present Illness:   Varghese Smith is a 78 y.o. male who is here today for intake appointment.     Patient is seen and evaluated with his wife present.  Patient and his wife are both pleasant, and patient appears to be in no acute distress today.  He is calm and cooperative with the evaluation, and exhibits a linear and goal directed thought process. Patient states that he was referred to behavioral health by his neurologist because he was \"going through some anxiety at the time\".  Approximately 4 years ago, patient's mother-in-law's health was declining and he and his wife were taking over a lot of the responsibility of taking care of her.  This lasted for about 2 years.  Patient and his wife both describe this as a very difficult time for them, and described as having had an impact on their relationship.  During this time, patient became more depressed and anxious.  This is when patient started to notice that he was becoming more forgetful/easily distracted.  Since, patient's mother-in-law's caregivers stepped up and took over, so he and his wife have less responsibility.  His wife is able to go visit her mom now, and he does not have to be there.  However, patient continues to struggle with temporary lapses in memory.  This may include forgetting people's names, forgetting to finish tasks, and having more difficulties when telling stories.  Patient admits to having a hard time doing things like bouncing his checkbooks.  He has been concerned that he needs medication for ADHD.  Mood wise, patient states that he is good most days.  He denies symptoms of depression such as excessive feelings of guilt, tearfulness, feelings " of hopelessness, lack of energy/motivation.  He denies suicidal ideation, plan, intent.  He does admit to getting overwhelmed/irritable at times and feeling anxious.  He gets upset if he notices that he has been procrastinating with things/does not finish things.  Overall, his sleep is good and he uses a CPAP at night.  His appetite is good.  He has fallen once or twice at home, but physically is in good shape.  He typically walks 4 to 5 miles a day.  He is still able to cut his own grass and do yard work around the house.  He is very active in his [employer].  Patient denies symptoms of anny, auditory or visual hallucinations.  He does not elicit any signs of psychosis.    We talked about medication options.  Patient had a short trial of Zoloft, but discontinued it after he did not feel any effect after 1 month.  Today, we discussed trial of Rexulti to aid with cognition as well as mood.  Patient is agreeable to a trial of this medication.  We discussed risks/benefits/side effects.  We will follow back up in 1 month to see how he is tolerating it.    Subjective      Review of Systems:   Review of Systems   Constitutional:  Negative for chills, fatigue and fever.   HENT:  Negative for congestion, sore throat and trouble swallowing.    Eyes:  Negative for blurred vision and double vision.   Respiratory:  Negative for cough, chest tightness and shortness of breath.    Cardiovascular:  Negative for chest pain and palpitations.   Gastrointestinal:  Negative for abdominal pain, constipation, diarrhea, nausea and vomiting.   Endocrine: Negative for polydipsia and polyuria.   Genitourinary:  Negative for hematuria and urgency.   Musculoskeletal:  Negative for arthralgias.   Skin:  Negative for skin lesions and bruise.   Neurological:  Negative for dizziness, tremors, seizures and light-headedness.   Hematological:  Negative for adenopathy.     Screening Scores:   PHQ-9 : 4  RAJESH-7 : 2    Past Psychiatric History:   History of  outpatient psychiatrist: denies  History of outpatient therapy: denies  Previous Inpatient hospitalizations: denies  Previous medication trials: Zoloft  History of suicide/self harm attempts: denies     Abuse/trauma History:              Physical: denies              Sexual: denies              Emotional/Neglect: denies                Substance Abuse History:              Alcohol: denies              Tobacco/Vape: denies              Illicit Drugs: denies                Legal History:   denies     Social History:  Where was patient born: [place]  Where does patient currently live: [place]  Highest level of education obtained: some college  Living situation: Lives with wife  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  Leisure and Recreation: watching sports, spending time with [employer]  Support system: [place] family, family  Hoahaoism: Lutheran     Family History:   Family History   Problem Relation Age of Onset    Arthritis Mother     Dementia Mother        Psychiatric History:   Psych Diagnosis: Mother: dementia  History of suicide/self harm attempts: denies  History of Substance abuse: son    Past Medical History:   Past Medical History:   Diagnosis Date    Anemia     Arthritis     Asthma     Diverticulitis     Erectile dysfunction     GERD (gastroesophageal reflux disease)     Headache     History of medical problems     HL (hearing loss) 01/01/2020    Kidney stone     Memory loss 2020     Past Surgical History:   Past Surgical History:   Procedure Laterality Date    HERNIA REPAIR      JOINT REPLACEMENT      TOTAL HIP ARTHROPLASTY Right     VASECTOMY       Medications:     Current Outpatient Medications:     albuterol sulfate  (90 Base) MCG/ACT inhaler, , Disp: , Rfl:     Ferrous Sulfate (IRON PO), 50 mg., Disp: , Rfl:     fluticasone (FLONASE) 50 MCG/ACT nasal spray, Flonase Allergy Relief 50 mcg/actuation nasal spray,suspension  2 sprays in each nostril  "once daily, Disp: , Rfl:     Fluticasone-Salmeterol (ADVAIR) 250-50 MCG/ACT DISKUS, Advair Diskus 250 mcg-50 mcg/dose powder for inhalation  INHALE ONE PUFF BY MOUTH TWICE A DAY, Disp: , Rfl:     montelukast (SINGULAIR) 10 MG tablet, montelukast 10 mg tablet  TAKE ONE TABLET BY MOUTH DAILY, Disp: , Rfl:     omeprazole (priLOSEC) 20 MG capsule, TAKE 1 CAPSULE BY MOUTH EVERY DAY, Disp: 90 capsule, Rfl: 0    Brexpiprazole (Rexulti) 0.5 MG tablet, Take 0.5 mg by mouth Daily., Disp: 7 tablet, Rfl: 0    Brexpiprazole (Rexulti) 1 MG tablet, Take 1 mg by mouth Daily., Disp: 30 tablet, Rfl: 0    Brexpiprazole (Rexulti) 1 MG tablet, Take 1 mg by mouth Daily., Disp: 21 tablet, Rfl: 0    Medication Considerations:  DIVINA reviewed and appropriate.      Allergies:   No Known Allergies    Objective     Physical Exam:  Vital Signs:   Vitals:    10/25/23 1248   BP: 118/76   Pulse: 82   SpO2: 93%   Weight: 85 kg (187 lb 8 oz)   Height: 170.2 cm (67.01\")     Body mass index is 29.36 kg/m².     Mental Status Exam:   MENTAL STATUS EXAM   General Appearance:  Cleanly groomed and dressed  Eye Contact:  Good eye contact  Attitude:  Cooperative  Motor Activity:  Normal gait, posture  Muscle Strength:  Normal  Speech:  Normal rate, tone, volume  Language:  Spontaneous  Mood and affect:  Normal, pleasant and appropriate  Hopelessness:  Denies  Thought Process:  Logical and goal-directed  Associations/ Thought Content:  No delusions  Hallucinations:  None  Suicidal Ideations:  Not present  Homicidal Ideation:  Not present  Sensorium:  Alert  Orientation:  Person, place, time and situation  Immediate Recall, Recent, and Remote Memory:  Other  Other Comment:  Patient has noticed some disturbances with memory  Attention Span/ Concentration:  Easily distracted  Fund of Knowledge:  Appropriate for age and educational level  Intellectual Functioning:  Average range  Insight:  Fair  Judgement:  Fair  Reliability:  Fair  Impulse Control:  Fair   "   SUICIDE RISK ASSESSMENT/CSSRS:  1. Does patient have thoughts of suicide? denies  2. Does patient have intent for suicide? denies  3. Does patient have a current plan for suicide? denies  4. History of suicide attempts: denies  5. Family history of suicide or attempts: denies  6. History of violent behaviors towards others or property or thoughts of committing suicide: denies  7. History of sexual aggression toward others: denies  8. Access to firearms or weapons: denies    Assessment / Plan      Visit Diagnosis/Orders Placed This Visit:  Diagnoses and all orders for this visit:    1. Major depressive disorder, recurrent episode, moderate degree (Primary)  - Start Rexulti 0.5 mg po daily for 7 days then increase to 1 mg po daily   Provided sample starter pack + 2 week supply of 1 mg po daily  - Follow up with writer in 1 mo  Chart Reviewed     Functional Status: Mild impairment     Prognosis: Good with Ongoing Treatment     Impression/Formulation:  Patient appeared alert and oriented.  Patient is voluntarily requesting to begin outpatient treatment at [facility] [address].  Patient is receptive to assistance with maintaining a stable lifestyle.  Patient presents with history of     ICD-10-CM ICD-9-CM   1. Major depressive disorder, recurrent episode, moderate degree  F33.1 296.32     Treatment Plan:     Patient will continue supportive psychotherapy efforts and medications as indicated. Clinic will obtain release of information for current treatment team for continuity of care as needed. Patient will contact this office, call 911 or present to the nearest emergency room should suicidal or homicidal ideations occur. Discussed medication options and treatment plan of prescribed medication(s) as well as the risks, benefits, and potential side effects. Patient ackowledged and verbally consented to continue with current treatment plan and was educated on the importance of compliance with  treatment and follow-up appointments.     Follow Up:   Return in about 1 month (around 11/25/2023) for Medication Management.    Quality Measures:   Tobacco: patient denies tobacco use and declines need for further education regarding use/cessation.     Aida Mota MD   [facility] [address]     This is electronically signed by Aida Mota MD  10/25/2023 12:41 EDT

## 2023-10-26 ENCOUNTER — TELEPHONE (OUTPATIENT)
Age: 78
End: 2023-10-26
Payer: MEDICARE

## 2023-10-26 NOTE — TELEPHONE ENCOUNTER
I would recommend for him to speak with the neurologist that scheduled it to see what she thinks. I don't know if there were specific things she was hoping to rule out with the scan.

## 2023-10-26 NOTE — TELEPHONE ENCOUNTER
PT CALLED STATING THAT HE HAS A BRAIN SCAN NEXT WEEK.  HE WAS WONDERING IF YOU THINK HE REALLY HAS TO HAVE THAT DONE.  HE FEELS LIKE HE DOESN'T AFTER SPEAKING WITH YOU YESTERDAY.  PLEASE ADVISE

## 2023-10-30 ENCOUNTER — OFFICE VISIT (OUTPATIENT)
Age: 78
End: 2023-10-30
Payer: MEDICARE

## 2023-10-30 ENCOUNTER — LAB (OUTPATIENT)
Age: 78
End: 2023-10-30
Payer: MEDICARE

## 2023-10-30 VITALS
SYSTOLIC BLOOD PRESSURE: 128 MMHG | BODY MASS INDEX: 29.38 KG/M2 | TEMPERATURE: 97.3 F | HEIGHT: 67 IN | OXYGEN SATURATION: 96 % | DIASTOLIC BLOOD PRESSURE: 78 MMHG | WEIGHT: 187.2 LBS | HEART RATE: 77 BPM

## 2023-10-30 DIAGNOSIS — Z71.85 IMMUNIZATION COUNSELING: ICD-10-CM

## 2023-10-30 DIAGNOSIS — Z00.00 MEDICARE ANNUAL WELLNESS VISIT, SUBSEQUENT: Primary | ICD-10-CM

## 2023-10-30 DIAGNOSIS — R39.198 DECREASED URINE STREAM: ICD-10-CM

## 2023-10-30 DIAGNOSIS — Z12.5 SPECIAL SCREENING FOR MALIGNANT NEOPLASM OF PROSTATE: ICD-10-CM

## 2023-10-30 DIAGNOSIS — R39.198 SLOWING OF URINARY STREAM: Primary | ICD-10-CM

## 2023-10-30 DIAGNOSIS — Z12.5 ENCOUNTER FOR SCREENING FOR MALIGNANT NEOPLASM OF PROSTATE: ICD-10-CM

## 2023-10-30 DIAGNOSIS — Z91.81 AT HIGH RISK FOR FALLS: ICD-10-CM

## 2023-10-30 DIAGNOSIS — R05.1 ACUTE COUGH: ICD-10-CM

## 2023-10-30 DIAGNOSIS — Z00.00 ROUTINE GENERAL MEDICAL EXAMINATION AT A HEALTH CARE FACILITY: ICD-10-CM

## 2023-10-30 DIAGNOSIS — Z00.00 MEDICARE ANNUAL WELLNESS VISIT, SUBSEQUENT: ICD-10-CM

## 2023-10-30 LAB
ALBUMIN SERPL-MCNC: 3.8 G/DL (ref 3.5–5.2)
ALBUMIN/GLOB SERPL: 1.7 G/DL
ALP SERPL-CCNC: 85 U/L (ref 39–117)
ALT SERPL W P-5'-P-CCNC: 17 U/L (ref 1–41)
ANION GAP SERPL CALCULATED.3IONS-SCNC: 5.9 MMOL/L (ref 5–15)
AST SERPL-CCNC: 25 U/L (ref 1–40)
BILIRUB SERPL-MCNC: 0.3 MG/DL (ref 0–1.2)
BUN SERPL-MCNC: 13 MG/DL (ref 8–23)
BUN/CREAT SERPL: 15.5 (ref 7–25)
CALCIUM SPEC-SCNC: 8.9 MG/DL (ref 8.6–10.5)
CHLORIDE SERPL-SCNC: 109 MMOL/L (ref 98–107)
CHOLEST SERPL-MCNC: 147 MG/DL (ref 0–200)
CO2 SERPL-SCNC: 25.1 MMOL/L (ref 22–29)
CREAT SERPL-MCNC: 0.84 MG/DL (ref 0.76–1.27)
DEPRECATED RDW RBC AUTO: 42.7 FL (ref 37–54)
EGFRCR SERPLBLD CKD-EPI 2021: 88.7 ML/MIN/1.73
ERYTHROCYTE [DISTWIDTH] IN BLOOD BY AUTOMATED COUNT: 12.2 % (ref 12.3–15.4)
GLOBULIN UR ELPH-MCNC: 2.3 GM/DL
GLUCOSE SERPL-MCNC: 106 MG/DL (ref 65–99)
HCT VFR BLD AUTO: 39.6 % (ref 37.5–51)
HDLC SERPL-MCNC: 46 MG/DL (ref 40–60)
HGB BLD-MCNC: 13.9 G/DL (ref 13–17.7)
LDLC SERPL CALC-MCNC: 78 MG/DL (ref 0–100)
LDLC/HDLC SERPL: 1.64 {RATIO}
MCH RBC QN AUTO: 34.2 PG (ref 26.6–33)
MCHC RBC AUTO-ENTMCNC: 35.1 G/DL (ref 31.5–35.7)
MCV RBC AUTO: 97.5 FL (ref 79–97)
PLATELET # BLD AUTO: 211 10*3/MM3 (ref 140–450)
PMV BLD AUTO: 10.3 FL (ref 6–12)
POTASSIUM SERPL-SCNC: 4.5 MMOL/L (ref 3.5–5.2)
PROT SERPL-MCNC: 6.1 G/DL (ref 6–8.5)
RBC # BLD AUTO: 4.06 10*6/MM3 (ref 4.14–5.8)
SODIUM SERPL-SCNC: 140 MMOL/L (ref 136–145)
TRIGL SERPL-MCNC: 128 MG/DL (ref 0–150)
VLDLC SERPL-MCNC: 23 MG/DL (ref 5–40)
WBC NRBC COR # BLD: 4.56 10*3/MM3 (ref 3.4–10.8)

## 2023-10-30 PROCEDURE — 36415 COLL VENOUS BLD VENIPUNCTURE: CPT | Performed by: NURSE PRACTITIONER

## 2023-10-30 PROCEDURE — 99213 OFFICE O/P EST LOW 20 MIN: CPT | Performed by: NURSE PRACTITIONER

## 2023-10-30 PROCEDURE — G0103 PSA SCREENING: HCPCS | Performed by: NURSE PRACTITIONER

## 2023-10-30 PROCEDURE — 80053 COMPREHEN METABOLIC PANEL: CPT | Performed by: NURSE PRACTITIONER

## 2023-10-30 PROCEDURE — 80061 LIPID PANEL: CPT | Performed by: NURSE PRACTITIONER

## 2023-10-30 PROCEDURE — 85027 COMPLETE CBC AUTOMATED: CPT | Performed by: NURSE PRACTITIONER

## 2023-10-30 PROCEDURE — G0439 PPPS, SUBSEQ VISIT: HCPCS | Performed by: NURSE PRACTITIONER

## 2023-10-30 RX ORDER — DEXTROMETHORPHAN HYDROBROMIDE AND PROMETHAZINE HYDROCHLORIDE 15; 6.25 MG/5ML; MG/5ML
2.5 SYRUP ORAL 4 TIMES DAILY PRN
Qty: 118 ML | Refills: 0 | Status: SHIPPED | OUTPATIENT
Start: 2023-10-30

## 2023-10-30 NOTE — PATIENT INSTRUCTIONS
Advance Care Planning and Advance Directives     You make decisions on a daily basis - decisions about where you want to live, your career, your home, your life. Perhaps one of the most important decisions you face is your choice for future medical care. Take time to talk with your family and your healthcare team and start planning today.  Advance Care Planning is a process that can help you:  Understand possible future healthcare decisions in light of your own experiences  Reflect on those decision in light of your goals and values  Discuss your decisions with those closest to you and the healthcare professionals that care for you  Make a plan by creating a document that reflects your wishes    Surrogate Decision Maker  In the event of a medical emergency, which has left you unable to communicate or to make your own decisions, you would need someone to make decisions for you.  It is important to discuss your preferences for medical treatment with this person while you are in good health.     Qualities of a surrogate decision maker:  Willing to take on this role and responsibility  Knows what you want for future medical care  Willing to follow your wishes even if they don't agree with them  Able to make difficult medical decisions under stressful circumstances    Advance Directives  These are legal documents you can create that will guide your healthcare team and decision maker(s) when needed. These documents can be stored in the electronic medical record.    Living Will - a legal document to guide your care if you have a terminal condition or a serious illness and are unable to communicate. States vary by statute in document names/types, but most forms may include one or more of the following:        -  Directions regarding life-prolonging treatments        -  Directions regarding artificially provided nutrition/hydration        -  Choosing a healthcare decision maker        -  Direction regarding organ/tissue  donation    Durable Power of  for Healthcare - this document names an -in-fact to make medical decisions for you, but it may also allow this person to make personal and financial decisions for you. Please seek the advice of an  if you need this type of document.    **Advance Directives are not required and no one may discriminate against you if you do not sign one.    Medical Orders  Many states allow specific forms/orders signed by your physician to record your wishes for medical treatment in your current state of health. This form, signed in personal communication with your physician, addresses resuscitation and other medical interventions that you may or may not want.      For more information or to schedule a time with a [facility] Advance Care Planning Facilitator contact: [facility] or call [phone] and someone will contact you directly.  Fall Prevention in the Home, Adult  Falls can cause injuries and affect people of all ages. There are many simple things that you can do to make your home safe and to help prevent falls. Ask for help when making these changes, if needed.  What actions can I take to prevent falls?  General instructions  Use good lighting in all rooms. Replace any light bulbs that burn out, turn on lights if it is dark, and use night-lights.  Place frequently used items in easy-to-reach places. Lower the shelves around your home if necessary.  Set up furniture so that there are clear paths around it. Avoid moving your furniture around.  Remove throw rugs and other tripping hazards from the floor.  Avoid walking on wet floors.  Fix any uneven floor surfaces.  Add color or contrast paint or tape to grab bars and handrails in your home. Place contrasting color strips on the first and last steps of staircases.  When you use a stepladder, make sure that it is completely opened and that the sides and supports are firmly locked. Have someone hold the ladder  while you are using it. Do not climb a closed stepladder.  Know where your pets are when moving through your home.  What can I do in the bathroom?         Keep the floor dry. Immediately clean up any water that is on the floor.  Remove soap buildup in the tub or shower regularly.  Use nonskid mats or decals on the floor of the tub or shower.  Attach bath mats securely with double-sided, nonslip rug tape.  If you need to sit down while you are in the shower, use a plastic, nonslip stool.  Install grab bars by the toilet and in the tub and shower. Do not use towel bars as grab bars.  What can I do in the bedroom?  Make sure that a bedside light is easy to reach.  Do not use oversized bedding that reaches the floor.  Have a firm chair that has side arms to use for getting dressed.  What can I do in the kitchen?  Clean up any spills right away.  If you need to reach for something above you, use a sturdy step stool that has a grab bar.  Keep electrical cables out of the way.  Do not use floor polish or wax that makes floors slippery. If you must use wax, make sure that it is non-skid floor wax.  What can I do with my stairs?  Do not leave any items on the stairs.  Make sure that you have a light switch at the top and the bottom of the stairs. Have them installed if you do not have them.  Make sure that there are handrails on both sides of the stairs. Fix handrails that are broken or loose. Make sure that handrails are as long as the staircases.  Install non-slip stair treads on all stairs in your home.  Avoid having throw rugs at the top or bottom of stairs, or secure the rugs with carpet tape to prevent them from moving.  Choose a carpet design that does not hide the edge of steps on the stairs.  Check any carpeting to make sure that it is firmly attached to the stairs. Fix any carpet that is loose or worn.  What can I do on the outside of my home?  Use bright outdoor lighting.  Regularly repair the edges of walkways  and driveways and fix any cracks.  Remove high doorway thresholds.  Trim any shrubbery on the main path into your home.  Regularly check that handrails are securely fastened and in good repair. Both sides of all steps should have handrails.  Install guardrails along the edges of any raised decks or porches.  Clear walkways of debris and clutter, including tools and rocks.  Have leaves, snow, and ice cleared regularly.  Use sand or salt on walkways during winter months.  In the garage, clean up any spills right away, including grease or oil spills.  What other actions can I take?  Wear closed-toe shoes that fit well and support your feet. Wear shoes that have rubber soles or low heels.  Use mobility aids as needed, such as canes, walkers, scooters, and crutches.  Review your medicines with your health care provider. Some medicines can cause dizziness or changes in blood pressure, which increase your risk of falling.  Talk with your health care provider about other ways that you can decrease your risk of falls. This may include working with a physical therapist or  to improve your strength, balance, and endurance.  Where to find more information  Centers for Disease Control and Prevention, [place]: www.cdc.gov  National [place] on Aging: www.prashant.nih.gov  Contact a health care provider if:  You are afraid of falling at home.  You feel weak, drowsy, or dizzy at home.  You fall at home.  Summary  There are many simple things that you can do to make your home safe and to help prevent falls.  Ways to make your home safe include removing tripping hazards and installing grab bars in the bathroom.  Ask for help when making these changes in your home.  This information is not intended to replace advice given to you by your health care provider. Make sure you discuss any questions you have with your health care provider.  Document Revised: 09/19/2022 Document Reviewed: 07/21/2021  Elsevier Patient Education © 2023  Elsevier Inc.    Sit-to-Stand Exercise    The sit-to-stand exercise (also known as the chair stand or chair rise exercise) strengthens your lower body and helps you maintain or improve your mobility and independence. The end goal is to do the sit-to-stand exercise without using your hands. This will be easier as you become stronger. You should always talk with your health care provider before starting any exercise program, especially if you have had recent surgery.  Do the exercise exactly as told by your health care provider and adjust it as directed. It is normal to feel mild stretching, pulling, tightness, or discomfort as you do this exercise, but you should stop right away if you feel sudden pain or your pain gets worse. Do not begin doing this exercise until told by your health care provider.  What the sit-to-stand exercise does  The sit-to-stand exercise helps to strengthen the muscles in your thighs and the muscles in the center of your body that give you stability (core muscles). This exercise is especially helpful if:  You have had knee or hip surgery.  You have trouble getting up from a chair, out of a car, or off the toilet due to muscle weakness.  How to do the sit-to-stand exercise  Sit toward the front edge of a sturdy chair without armrests. Your knees should be bent and your feet should be flat on the floor and shoulder-width apart and underneath your hips.  Place your hands lightly on each side of the seat. Keep your back and neck as straight as possible, with your chest slightly forward.  Breathe in slowly. Lean forward and slightly shift your weight to the front of your feet.  Breathe out as you slowly stand up. Try not to support any weight with your hands.  Stand and pause for a full breath in and out.  Breathe in as you sit down slowly. Tighten your core and abdominal muscles to control your lowering as much as possible. You should lower yourself back to the chair slowly, not just drop back  into the seat.  Breathe out slowly.  Do this exercise 10-15 times. If needed, do it fewer times until you build up strength.  Rest for 1 minute, then do another set of 10-15 repetitions.  To change the difficulty of the sit-to-stand exercise  If the exercise is too difficult, use a chair with sturdy armrests, and push off the armrests to help you come to the standing position. You can also use the armrests to help slowly lower yourself back to sitting. As this gets easier, try to use your arms less. You can also place a firm cushion or pillow on the chair to make the surface higher.  If this exercise is too easy, do not use your arms to help raise or lower yourself. You can also wear a weighted vest, use hand weights, increase your repetitions, or try a lower chair.  General tips  You may feel tired when starting an exercise routine. This is normal.  You may have muscle soreness that lasts a few days. This is normal. As you get stronger, you may not feel muscle soreness.  Use smooth, steady movements.  Do not  hold your breath during strength exercises. This can cause unsafe changes in your blood pressure.  Breathe in slowly through your nose, and breathe out slowly through your mouth.  Summary  Strengthening your lower body is an important step to help you move safely and independently.  The sit-to-stand exercise helps strengthen the muscles in your thighs and core.  You should always talk with your health care provider before starting any exercise program, especially if you have had recent surgery.  This information is not intended to replace advice given to you by your health care provider. Make sure you discuss any questions you have with your health care provider.  Document Revised: 04/10/2022 Document Reviewed: 04/10/2022  Elsevier Patient Education © 2023 Elsevier Inc.    You are due for Shingrix vaccination series ( the newest shingles vaccine).  It is a two shot series spaced 2-6 months apart. Please get  this vaccine series started at your earliest convenience at your local pharmacy to help avoid shingles outbreak. It is more effective than the old Zostavax vaccine and is recommended even if you have had the Zostavax vaccine in the past.  Once the Shingrix series is completed, it does not need to be repeated.   For more information, please look at the website below:  [facility] Shingrix Vaccine Information      Medicare Wellness  Personal Prevention Plan of Service     Date of Office Visit:    Encounter Provider:  HONEY Patel  Place of Service:  [facility]  Patient Name: Varghese Smith  :  1945    As part of the Medicare Wellness portion of your visit today, we are providing you with this personalized preventive plan of services (PPPS). This plan is based upon recommendations of the [place] Preventive Services Task Force (USPSTF) and the Advisory Committee on Immunization Practices (ACIP).    This lists the preventive care services that should be considered, and provides dates of when you are due. Items listed as completed are up-to-date and do not require any further intervention.    Health Maintenance   Topic Date Due   • BMI FOLLOWUP  Never done   • ZOSTER VACCINE (2 of 3) 2013   • HEPATITIS C SCREENING  Never done   • ANNUAL WELLNESS VISIT  2023   • COVID-19 Vaccine (3 - -24 season) 2023   • INFLUENZA VACCINE  2024 (Originally 2023)   • TDAP/TD VACCINES (4 - Td or Tdap) 2033   • Pneumococcal Vaccine 65+  Completed       No orders of the defined types were placed in this encounter.      No follow-ups on file.            Advance Care Planning and Advance Directives     You make decisions on a daily basis - decisions about where you want to live, your career, your home, your life. Perhaps one of the most important decisions you face is your choice for future medical care. Take time to talk with your family and your healthcare team and  start planning today.  Advance Care Planning is a process that can help you:  Understand possible future healthcare decisions in light of your own experiences  Reflect on those decision in light of your goals and values  Discuss your decisions with those closest to you and the healthcare professionals that care for you  Make a plan by creating a document that reflects your wishes    Surrogate Decision Maker  In the event of a medical emergency, which has left you unable to communicate or to make your own decisions, you would need someone to make decisions for you.  It is important to discuss your preferences for medical treatment with this person while you are in good health.     Qualities of a surrogate decision maker:  Willing to take on this role and responsibility  Knows what you want for future medical care  Willing to follow your wishes even if they don't agree with them  Able to make difficult medical decisions under stressful circumstances    Advance Directives  These are legal documents you can create that will guide your healthcare team and decision maker(s) when needed. These documents can be stored in the electronic medical record.    Living Will - a legal document to guide your care if you have a terminal condition or a serious illness and are unable to communicate. States vary by statute in document names/types, but most forms may include one or more of the following:        -  Directions regarding life-prolonging treatments        -  Directions regarding artificially provided nutrition/hydration        -  Choosing a healthcare decision maker        -  Direction regarding organ/tissue donation    Durable Power of  for Healthcare - this document names an -in-fact to make medical decisions for you, but it may also allow this person to make personal and financial decisions for you. Please seek the advice of an  if you need this type of document.    **Advance Directives are not  required and no one may discriminate against you if you do not sign one.    Medical Orders  Many states allow specific forms/orders signed by your physician to record your wishes for medical treatment in your current state of health. This form, signed in personal communication with your physician, addresses resuscitation and other medical interventions that you may or may not want.      For more information or to schedule a time with a [facility] Advance Care Planning Facilitator contact: [facility] or call [phone] and someone will contact you directly.  Fall Prevention in the Home, Adult  Falls can cause injuries and affect people of all ages. There are many simple things that you can do to make your home safe and to help prevent falls. Ask for help when making these changes, if needed.  What actions can I take to prevent falls?  General instructions  Use good lighting in all rooms. Replace any light bulbs that burn out, turn on lights if it is dark, and use night-lights.  Place frequently used items in easy-to-reach places. Lower the shelves around your home if necessary.  Set up furniture so that there are clear paths around it. Avoid moving your furniture around.  Remove throw rugs and other tripping hazards from the floor.  Avoid walking on wet floors.  Fix any uneven floor surfaces.  Add color or contrast paint or tape to grab bars and handrails in your home. Place contrasting color strips on the first and last steps of staircases.  When you use a stepladder, make sure that it is completely opened and that the sides and supports are firmly locked. Have someone hold the ladder while you are using it. Do not climb a closed stepladder.  Know where your pets are when moving through your home.  What can I do in the bathroom?         Keep the floor dry. Immediately clean up any water that is on the floor.  Remove soap buildup in the tub or shower regularly.  Use nonskid mats or decals on the  floor of the tub or shower.  Attach bath mats securely with double-sided, nonslip rug tape.  If you need to sit down while you are in the shower, use a plastic, nonslip stool.  Install grab bars by the toilet and in the tub and shower. Do not use towel bars as grab bars.  What can I do in the bedroom?  Make sure that a bedside light is easy to reach.  Do not use oversized bedding that reaches the floor.  Have a firm chair that has side arms to use for getting dressed.  What can I do in the kitchen?  Clean up any spills right away.  If you need to reach for something above you, use a sturdy step stool that has a grab bar.  Keep electrical cables out of the way.  Do not use floor polish or wax that makes floors slippery. If you must use wax, make sure that it is non-skid floor wax.  What can I do with my stairs?  Do not leave any items on the stairs.  Make sure that you have a light switch at the top and the bottom of the stairs. Have them installed if you do not have them.  Make sure that there are handrails on both sides of the stairs. Fix handrails that are broken or loose. Make sure that handrails are as long as the staircases.  Install non-slip stair treads on all stairs in your home.  Avoid having throw rugs at the top or bottom of stairs, or secure the rugs with carpet tape to prevent them from moving.  Choose a carpet design that does not hide the edge of steps on the stairs.  Check any carpeting to make sure that it is firmly attached to the stairs. Fix any carpet that is loose or worn.  What can I do on the outside of my home?  Use bright outdoor lighting.  Regularly repair the edges of walkways and driveways and fix any cracks.  Remove high doorway thresholds.  Trim any shrubbery on the main path into your home.  Regularly check that handrails are securely fastened and in good repair. Both sides of all steps should have handrails.  Install guardrails along the edges of any raised decks or porches.  Clear  walkways of debris and clutter, including tools and rocks.  Have leaves, snow, and ice cleared regularly.  Use sand or salt on walkways during winter months.  In the garage, clean up any spills right away, including grease or oil spills.  What other actions can I take?  Wear closed-toe shoes that fit well and support your feet. Wear shoes that have rubber soles or low heels.  Use mobility aids as needed, such as canes, walkers, scooters, and crutches.  Review your medicines with your health care provider. Some medicines can cause dizziness or changes in blood pressure, which increase your risk of falling.  Talk with your health care provider about other ways that you can decrease your risk of falls. This may include working with a physical therapist or  to improve your strength, balance, and endurance.  Where to find more information  Centers for Disease Control and PreventionGUCCI: www.cdc.gov  National [place] on Aging: www.prashant.nih.gov  Contact a health care provider if:  You are afraid of falling at home.  You feel weak, drowsy, or dizzy at home.  You fall at home.  Summary  There are many simple things that you can do to make your home safe and to help prevent falls.  Ways to make your home safe include removing tripping hazards and installing grab bars in the bathroom.  Ask for help when making these changes in your home.  This information is not intended to replace advice given to you by your health care provider. Make sure you discuss any questions you have with your health care provider.  Document Revised: 09/19/2022 Document Reviewed: 07/21/2021  [employer] Patient Education © 2023 [employer]    Sit-to-Stand Exercise    The sit-to-stand exercise (also known as the chair stand or chair rise exercise) strengthens your lower body and helps you maintain or improve your mobility and independence. The end goal is to do the sit-to-stand exercise without using your hands. This will be easier as you  become stronger. You should always talk with your health care provider before starting any exercise program, especially if you have had recent surgery.  Do the exercise exactly as told by your health care provider and adjust it as directed. It is normal to feel mild stretching, pulling, tightness, or discomfort as you do this exercise, but you should stop right away if you feel sudden pain or your pain gets worse. Do not begin doing this exercise until told by your health care provider.  What the sit-to-stand exercise does  The sit-to-stand exercise helps to strengthen the muscles in your thighs and the muscles in the center of your body that give you stability (core muscles). This exercise is especially helpful if:  You have had knee or hip surgery.  You have trouble getting up from a chair, out of a car, or off the toilet due to muscle weakness.  How to do the sit-to-stand exercise  Sit toward the front edge of a sturdy chair without armrests. Your knees should be bent and your feet should be flat on the floor and shoulder-width apart and underneath your hips.  Place your hands lightly on each side of the seat. Keep your back and neck as straight as possible, with your chest slightly forward.  Breathe in slowly. Lean forward and slightly shift your weight to the front of your feet.  Breathe out as you slowly stand up. Try not to support any weight with your hands.  Stand and pause for a full breath in and out.  Breathe in as you sit down slowly. Tighten your core and abdominal muscles to control your lowering as much as possible. You should lower yourself back to the chair slowly, not just drop back into the seat.  Breathe out slowly.  Do this exercise 10-15 times. If needed, do it fewer times until you build up strength.  Rest for 1 minute, then do another set of 10-15 repetitions.  To change the difficulty of the sit-to-stand exercise  If the exercise is too difficult, use a chair with sturdy armrests, and push  off the armrests to help you come to the standing position. You can also use the armrests to help slowly lower yourself back to sitting. As this gets easier, try to use your arms less. You can also place a firm cushion or pillow on the chair to make the surface higher.  If this exercise is too easy, do not use your arms to help raise or lower yourself. You can also wear a weighted vest, use hand weights, increase your repetitions, or try a lower chair.  General tips  You may feel tired when starting an exercise routine. This is normal.  You may have muscle soreness that lasts a few days. This is normal. As you get stronger, you may not feel muscle soreness.  Use smooth, steady movements.  Do not  hold your breath during strength exercises. This can cause unsafe changes in your blood pressure.  Breathe in slowly through your nose, and breathe out slowly through your mouth.  Summary  Strengthening your lower body is an important step to help you move safely and independently.  The sit-to-stand exercise helps strengthen the muscles in your thighs and core.  You should always talk with your health care provider before starting any exercise program, especially if you have had recent surgery.  This information is not intended to replace advice given to you by your health care provider. Make sure you discuss any questions you have with your health care provider.  Document Revised: 04/10/2022 Document Reviewed: 04/10/2022  Elsevier Patient Education © 2023 [employer] Inc[employer]    You are due for Shingrix vaccination series ( the newest shingles vaccine).  It is a two shot series spaced 2-6 months apart. Please get this vaccine series started at your earliest convenience at your local pharmacy to help avoid shingles outbreak. It is more effective than the old Zostavax vaccine and is recommended even if you have had the Zostavax vaccine in the past.  Once the Shingrix series is completed, it does not need to be repeated.   For more  information, please look at the website below:  [facility] Shingrix Vaccine Information      Medicare Wellness  Personal Prevention Plan of Service     Date of Office Visit:    Encounter Provider:  HONEY Patel  Place of Service:  [facility]  Patient Name: Varghese Smith  :  1945    As part of the Medicare Wellness portion of your visit today, we are providing you with this personalized preventive plan of services (PPPS). This plan is based upon recommendations of the [place] Preventive Services Task Force (USPSTF) and the Advisory Committee on Immunization Practices (ACIP).    This lists the preventive care services that should be considered, and provides dates of when you are due. Items listed as completed are up-to-date and do not require any further intervention.    Health Maintenance   Topic Date Due   • BMI FOLLOWUP  Never done   • ZOSTER VACCINE (2 of 3) 2013   • HEPATITIS C SCREENING  Never done   • ANNUAL WELLNESS VISIT  2023   • COVID-19 Vaccine (3 - -24 season) 2023   • INFLUENZA VACCINE  2024 (Originally 2023)   • TDAP/TD VACCINES (4 - Td or Tdap) 2033   • Pneumococcal Vaccine 65+  Completed       No orders of the defined types were placed in this encounter.      No follow-ups on file.

## 2023-10-30 NOTE — PROGRESS NOTES
The ABCs of the Annual Wellness Visit  Subsequent Medicare Wellness Visit    Subjective    Varghese Smith is a 78 y.o. male who presents for a Subsequent Medicare Wellness Visit.    The following portions of the patient's history were reviewed and   updated as appropriate: allergies, current medications, past family history, past medical history, past social history, past surgical history, and problem list.    Compared to one year ago, the patient feels his physical   health is the same.    Compared to one year ago, the patient feels his mental   health is the same.    Recent Hospitalizations:  He was not admitted to the hospital during the last year.       Current Medical Providers:  Patient Care Team:  Flora Griffith APRN as PCP - General (Nurse Practitioner)  Aida Mota MD as Consulting Physician (Psychiatry)    Outpatient Medications Prior to Visit   Medication Sig Dispense Refill    albuterol sulfate  (90 Base) MCG/ACT inhaler       Brexpiprazole (Rexulti) 1 MG tablet Take 1 mg by mouth Daily. 30 tablet 0    Ferrous Sulfate (IRON PO) 50 mg.      fluticasone (FLONASE) 50 MCG/ACT nasal spray Flonase Allergy Relief 50 mcg/actuation nasal spray,suspension   2 sprays in each nostril once daily      Fluticasone-Salmeterol (ADVAIR) 250-50 MCG/ACT DISKUS Advair Diskus 250 mcg-50 mcg/dose powder for inhalation   INHALE ONE PUFF BY MOUTH TWICE A DAY      montelukast (SINGULAIR) 10 MG tablet montelukast 10 mg tablet   TAKE ONE TABLET BY MOUTH DAILY      omeprazole (priLOSEC) 20 MG capsule TAKE 1 CAPSULE BY MOUTH EVERY DAY 90 capsule 0    Brexpiprazole (Rexulti) 0.5 MG tablet Take 0.5 mg by mouth Daily. 7 tablet 0    Brexpiprazole (Rexulti) 1 MG tablet Take 1 mg by mouth Daily. 21 tablet 0     No facility-administered medications prior to visit.       No opioid medication identified on active medication list. I have reviewed chart for other potential  high risk medication/s and harmful drug interactions in  "the elderly.        Aspirin is not on active medication list.  Aspirin use is not indicated based on review of current medical condition/s. Risk of harm outweighs potential benefits.  .    Patient Active Problem List   Diagnosis    Anemia    Asthma    Chronic rhinitis    Vitamin D deficiency    Diverticulosis    GERD (gastroesophageal reflux disease)    Low back pain    Prostate hypertrophy    Raised prostate specific antigen    Arthralgia of hip    Arthralgia of right knee    Cervical radiculopathy    Lower urinary tract symptoms due to benign prostatic hyperplasia    Mild intermittent asthma    Chronic rhinitis    High prostate specific antigen (PSA)     Advance Care Planning   Advance Care Planning     Advance Directive is not on file.  ACP discussion was held with the patient during this visit. Patient has an advance directive (not in EMR), copy requested.     Objective    Vitals:    10/30/23 1323   BP: 128/78   Pulse: 77   Temp: 97.3 °F (36.3 °C)   SpO2: 96%   Weight: 84.9 kg (187 lb 3.2 oz)   Height: 170.2 cm (67.01\")     Estimated body mass index is 29.31 kg/m² as calculated from the following:    Height as of this encounter: 170.2 cm (67.01\").    Weight as of this encounter: 84.9 kg (187 lb 3.2 oz).    BMI is >= 25 and <30. (Overweight) The following options were offered after discussion;: nutrition counseling/recommendations      Does the patient have evidence of cognitive impairment? No          HEALTH RISK ASSESSMENT    Smoking Status:  Social History     Tobacco Use   Smoking Status Never   Smokeless Tobacco Never     Alcohol Consumption:  Social History     Substance and Sexual Activity   Alcohol Use Never     Fall Risk Screen:    GUCCI Fall Risk Assessment was completed, and patient is at HIGH risk for falls. Assessment completed on:9/20/2023    Depression Screening:      10/25/2023    12:54 PM   PHQ-2/PHQ-9 Depression Screening   Little Interest or Pleasure in Doing Things 1-->several days   Feeling " Down, Depressed or Hopeless 0-->not at all   Trouble Falling or Staying Asleep, or Sleeping Too Much 0-->not at all   Feeling Tired or Having Little Energy 1-->several days   Poor Appetite or Overeating 0-->not at all   Feeling Bad about Yourself - or that You are a Failure or Have Let Yourself or Your Family Down 0-->not at all   Trouble Concentrating on Things, Such as Reading the Newspaper or Watching Television 1-->several days   Moving or Speaking So Slowly that Other People Could Have Noticed? Or the Opposite - Being So Fidgety 1-->several days   Thoughts that You Would be Better Off Dead or of Hurting Yourself in Some Way 0-->not at all   PHQ-9: Brief Depression Severity Measure Score 4   If You Checked Off Any Problems, How Difficult Have These Problems Made It For You to Do Your Work, Take Care of Things at Home, or Get Along with Other People? somewhat difficult       Health Habits and Functional and Cognitive Screenin/25/2022    10:10 AM   Functional & Cognitive Status   Do you have difficulty preparing food and eating? No   Do you have difficulty bathing yourself, getting dressed or grooming yourself? No   Do you have difficulty using the toilet? No   Do you have difficulty moving around from place to place? No   Do you have trouble with steps or getting out of a bed or a chair? No   Current Diet Well Balanced Diet   Dental Exam Not up to date   Eye Exam Up to date   Exercise (times per week) 4 times per week   Current Exercises Include Walking   Do you need help using the phone?  No   Are you deaf or do you have serious difficulty hearing?  Yes   Do you need help to go to places out of walking distance? No   Do you need help shopping? No   Do you need help preparing meals?  No   Do you need help with housework?  No   Do you need help with laundry? No   Do you need help taking your medications? No   Do you need help managing money? No   Do you ever drive or ride in a car without wearing a seat  belt? No   Have you felt unusual stress, anger or loneliness in the last month? No   Who do you live with? Spouse   If you need help, do you have trouble finding someone available to you? No   Have you been bothered in the last four weeks by sexual problems? No   Do you have difficulty concentrating, remembering or making decisions? No       Age-appropriate Screening Schedule:  Refer to the list below for future screening recommendations based on patient's age, sex and/or medical conditions. Orders for these recommended tests are listed in the plan section. The patient has been provided with a written plan.    Health Maintenance   Topic Date Due    ZOSTER VACCINE (2 of 3) 04/16/2013    HEPATITIS C SCREENING  Never done    COVID-19 Vaccine (3 - 2023-24 season) 09/01/2023    INFLUENZA VACCINE  03/31/2024 (Originally 8/1/2023)    ANNUAL WELLNESS VISIT  10/30/2024    BMI FOLLOWUP  10/30/2024    TDAP/TD VACCINES (4 - Td or Tdap) 09/04/2033    Pneumococcal Vaccine 65+  Completed                  CMS Preventative Services Quick Reference  Risk Factors Identified During Encounter  Fall Risk-High or Moderate: Discussed Fall Prevention in the home and Information on Fall Prevention Shared in After Visit Summary  Immunizations Discussed/Encouraged: Shingrix  The above risks/problems have been discussed with the patient.  Pertinent information has been shared with the patient in the After Visit Summary.  An After Visit Summary and PPPS were made available to the patient.    Follow Up:   Next Medicare Wellness visit to be scheduled in 1 year.       Additional E&M Note during same encounter follows:  Patient has multiple medical problems which are significant and separately identifiable that require additional work above and beyond the Medicare Wellness Visit.      Chief Complaint  Medicare Wellness-subsequent (Requesting cough medicine, cough starting 1 month ago)    Subjective        Pt is here today for annual wellness visit.  "He is having some issues with back pain. He had an injection in May for pain. He is seeing [facility]. Pt goes to [place] several months out of the year. He saw providers in [place] over the winter. Wife is present with him today. She states they did several tests in [place]. He fell in [place] and had infection at an abrasion site.     Pt also reports he has a cough that has been present for about 4 weeks. He had other symptoms at the beginning of the illness. Other symptoms have improved, but he still has a cough. He would like some cough medication to manage. Lungs are clear on exam today.      Varghese Smith is also being seen today for cough.    Review of Systems   Constitutional: Negative.    HENT:  Positive for postnasal drip.    Eyes:  Positive for visual disturbance.        Eye exam up to date; discussing cataract surgery.   Respiratory:  Positive for cough.    Cardiovascular:  Positive for leg swelling.        Wearing compression socks; mild swelling   Gastrointestinal: Negative.    Endocrine: Positive for cold intolerance.   Genitourinary:         Decreased stream.   Musculoskeletal:  Positive for back pain and neck pain.   Skin: Negative.    Neurological:  Positive for speech difficulty.   Hematological:  Bruises/bleeds easily.   Psychiatric/Behavioral: Negative.       Objective   Vital Signs:  /78   Pulse 77   Temp 97.3 °F (36.3 °C)   Ht 170.2 cm (67.01\")   Wt 84.9 kg (187 lb 3.2 oz)   SpO2 96%   BMI 29.31 kg/m²     Physical Exam  Vitals reviewed.   Constitutional:       Appearance: Normal appearance.   HENT:      Right Ear: Tympanic membrane, ear canal and external ear normal.      Left Ear: Tympanic membrane, ear canal and external ear normal.      Nose: Nose normal.      Mouth/Throat:      Mouth: Mucous membranes are moist.      Pharynx: Oropharynx is clear.   Eyes:      Conjunctiva/sclera: Conjunctivae normal.   Cardiovascular:      Rate and Rhythm: Normal rate and regular " rhythm.      Heart sounds: Normal heart sounds.   Pulmonary:      Effort: Pulmonary effort is normal.      Breath sounds: Normal breath sounds.   Abdominal:      General: Bowel sounds are normal.      Palpations: Abdomen is soft.      Tenderness: There is no abdominal tenderness.   Musculoskeletal:         General: Normal range of motion.      Cervical back: Normal range of motion.   Skin:     General: Skin is warm.   Neurological:      Mental Status: He is alert and oriented to person, place, and time.   Psychiatric:         Mood and Affect: Mood normal.         Behavior: Behavior normal.         Thought Content: Thought content normal.                         Assessment and Plan   Diagnoses and all orders for this visit:    1. Medicare annual wellness visit, subsequent (Primary)  -     CBC (No Diff); Future  -     Comprehensive Metabolic Panel; Future  -     Lipid Panel; Future    2. At high risk for falls    3. Immunization counseling    4. Decreased urine stream  -     PSA SCREENING; Future    5. Encounter for screening for malignant neoplasm of prostate  -     PSA SCREENING; Future    6. Acute cough  -     promethazine-dextromethorphan (PROMETHAZINE-DM) 6.25-15 MG/5ML syrup; Take 2.5 mL by mouth 4 (Four) Times a Day As Needed for Cough.  Dispense: 118 mL; Refill: 0           Follow Up   Return in about 6 months (around 4/30/2024) for Recheck.  Patient was given instructions and counseling regarding his condition or for health maintenance advice. Please see specific information pulled into the AVS if appropriate.

## 2023-10-31 LAB — PSA SERPL-MCNC: 4.69 NG/ML (ref 0–4)

## 2023-11-02 ENCOUNTER — HOSPITAL ENCOUNTER (OUTPATIENT)
Dept: MRI IMAGING | Facility: HOSPITAL | Age: 78
Discharge: HOME | End: 2023-11-02
Admitting: NURSE PRACTITIONER
Payer: MEDICARE

## 2023-11-02 DIAGNOSIS — F32.A ANXIETY AND DEPRESSION: ICD-10-CM

## 2023-11-02 DIAGNOSIS — F41.9 ANXIETY AND DEPRESSION: ICD-10-CM

## 2023-11-02 DIAGNOSIS — R41.3 MEMORY LOSS: ICD-10-CM

## 2023-11-02 PROCEDURE — 0 GADOBENATE DIMEGLUMINE 529 MG/ML SOLUTION: Performed by: NURSE PRACTITIONER

## 2023-11-02 PROCEDURE — 70553 MRI BRAIN STEM W/O & W/DYE: CPT

## 2023-11-02 PROCEDURE — A9577 INJ MULTIHANCE: HCPCS | Performed by: NURSE PRACTITIONER

## 2023-11-02 RX ADMIN — GADOBENATE DIMEGLUMINE 15 ML: 529 INJECTION, SOLUTION INTRAVENOUS at 14:06

## 2023-11-06 ENCOUNTER — TELEPHONE (OUTPATIENT)
Age: 78
End: 2023-11-06
Payer: MEDICARE

## 2023-11-06 NOTE — TELEPHONE ENCOUNTER
Name: ESVIN WITH [facility]    Relationship: Provider    Best Callback Number: [phone]     HUB PROVIDED THE RELAY MESSAGE FROM THE OFFICE   PATIENT VOICED UNDERSTANDING AND HAS NO FURTHER QUESTIONS AT THIS TIME

## 2023-11-06 NOTE — TELEPHONE ENCOUNTER
Caller: ESVIN WITH [facility]    Relationship: Provider    Best call back number:[phone]     What form or medical record are you requesting: GENETIC TESTING     Who is requesting this form or medical record from you: PATIENT     How would you like to receive the form or medical records (pick-up, mail, fax): FAX   If fax, what is the fax number: [phone]    Timeframe paperwork needed: ASAP     Additional notes: FAXED OVER REQUEST ON NOV 1,3 AND NOV 6. PLEASE RESPOND TO REQUEST

## 2023-11-06 NOTE — TELEPHONE ENCOUNTER
Attempted to contact patient, no answer. Left voicemail for patient to call the office back (office # given).     Hub may relay message & document.     Please find out if patient is having genetic testing done at the [facility]. If so, he will need to notify them that we have not received the paperwork they are requesting. If not, we will disregard the request.

## 2023-11-07 NOTE — TELEPHONE ENCOUNTER
Name: Varghese Smith    Relationship: Self    Best Callback Number: [phone]     HUB PROVIDED THE RELAY MESSAGE FROM THE OFFICE   PATIENT VOICED UNDERSTANDING AND HAS NO FURTHER QUESTIONS AT THIS TIME    ADDITIONAL INFORMATION: PATIENT WILL CONTACT [facility] TO RE-FAX THE PAPERWORK FOR GENETIC TESTING.

## 2023-11-09 ENCOUNTER — PATIENT MESSAGE (OUTPATIENT)
Age: 78
End: 2023-11-09
Payer: MEDICARE

## 2023-11-10 ENCOUNTER — TELEMEDICINE (OUTPATIENT)
Age: 78
End: 2023-11-10
Payer: MEDICARE

## 2023-11-10 DIAGNOSIS — R97.20 HIGH PROSTATE SPECIFIC ANTIGEN (PSA): Primary | ICD-10-CM

## 2023-11-10 RX ORDER — OMEPRAZOLE 20 MG/1
CAPSULE, DELAYED RELEASE ORAL
Qty: 90 CAPSULE | Refills: 0 | Status: SHIPPED | OUTPATIENT
Start: 2023-11-10

## 2023-11-10 NOTE — TELEPHONE ENCOUNTER
Rx Refill Note  Requested Prescriptions     Pending Prescriptions Disp Refills    omeprazole (priLOSEC) 20 MG capsule [Pharmacy Med Name: OMEPRAZOLE 20MG CAPSULES] 90 capsule 0     Sig: TAKE 1 CAPSULE BY MOUTH EVERY DAY      Last office visit with prescribing clinician: 10/30/2023   Last telemedicine visit with prescribing clinician: Visit date not found   Next office visit with prescribing clinician: Visit date not found     Aki Quintana Rep  11/10/23, 14:21 EST    Rx sent

## 2023-11-10 NOTE — PROGRESS NOTES
"Chief Complaint  Elevated PSA    Subjective         Varghese Smith presents to [facility]  History of Present Illness  Pt is here today to follow up on elevated PSA. He is established with Urology and has an appointment in December. He has had some mild symptoms lately, which were discussed at his last visit.     He has also been contacted by [facility] in [place], [place] regarding genetic testing. He initially agreed, but would like to discuss with them further before following through.     Objective   Vital Signs:   There were no vitals taken for this visit.    Estimated body mass index is 29.31 kg/m² as calculated from the following:    Height as of 10/30/23: 170.2 cm (67.01\").    Weight as of 10/30/23: 84.9 kg (187 lb 3.2 oz).     Physical Exam   Eyes: Pupils are equal, round, and reactive to light.   Pulmonary/Chest: Effort normal.   Musculoskeletal: Normal range of motion.   Neurological: He is alert.   Psychiatric: He has a normal mood and affect.     Result Review :                 Assessment and Plan    Diagnoses and all orders for this visit:    1. High prostate specific antigen (PSA) (Primary)        Follow Up   No follow-ups on file.  Patient was given instructions and counseling regarding his condition or for health maintenance advice. Please see specific information pulled into the AVS if appropriate.     Mode of Visit: Video  Location of patient: home  [place] [facility] [place] [facility]  You have chosen to receive care through a telehealth visit.  The patient has signed the video visit consent form.  The visit included audio and video interaction. No technical issues occurred during this visit.   "

## 2023-11-15 ENCOUNTER — TELEPHONE (OUTPATIENT)
Age: 78
End: 2023-11-15
Payer: MEDICARE

## 2023-11-15 NOTE — TELEPHONE ENCOUNTER
Caller: ARTIE WITH [facility]     Relationship: OTHER     Best call back number: [phone]      What form or medical record are you requesting: GENETIC TESTING      Who is requesting this form or medical record from you: PATIENT      How would you like to receive the form or medical records (pick-up, mail, fax): FAX   If fax, what is the fax number: [phone]     Timeframe paperwork needed: ASAP      Additional notes: FAXED OVER REQUEST ON NOV 1,3 AND NOV 6, NOVEMBER 15.      SEE ENCOUNTER DATED AND SIGNED 11.6.23    REQUESTS CALL BACK

## 2023-11-22 ENCOUNTER — OFFICE VISIT (OUTPATIENT)
Age: 78
End: 2023-11-22
Payer: MEDICARE

## 2023-11-22 VITALS
SYSTOLIC BLOOD PRESSURE: 134 MMHG | HEART RATE: 92 BPM | WEIGHT: 185.6 LBS | OXYGEN SATURATION: 93 % | DIASTOLIC BLOOD PRESSURE: 88 MMHG | BODY MASS INDEX: 29.13 KG/M2 | HEIGHT: 67 IN

## 2023-11-22 DIAGNOSIS — F33.1 MAJOR DEPRESSIVE DISORDER, RECURRENT EPISODE, MODERATE DEGREE: Primary | ICD-10-CM

## 2023-11-22 RX ORDER — BUPROPION HYDROCHLORIDE 150 MG/1
150 TABLET ORAL EVERY MORNING
Qty: 30 TABLET | Refills: 1 | Status: SHIPPED | OUTPATIENT
Start: 2023-11-22

## 2023-11-22 NOTE — PROGRESS NOTES
[facility]             Follow Up Office Visit      Date: 2023   Patient Name: Varghese Smith  : 1945   MRN: [id number]     Referring Provider: Flora Griffith APRN    Chief Complaint:      ICD-10-CM ICD-9-CM   1. Major depressive disorder, recurrent episode, moderate degree  F33.1 296.32        History of Present Illness:   Varghese Smith is a 78 y.o. male who is here today for follow up with MDD/Cognition.     Patient is seen and evaluated in the office. He appears to be in no acute distress at this time. He is calm and cooperative with evaluation and exhibits a linear and goal directed thought process.  Patient states that he has tried the Rexulti throughout the month and did not see any improvement in mood or inattentiveness/cognition.  He states that he has still been struggling with forgetfulness and flight of ideas.  He is easily distracted.  He speaks about his wife helping care for her mother.  This is played a lot of stress and strain on her.  He was having a difficult time when they are going back and forth to [place] weekly.  Patient admits that with the stress of this and upcoming holidays he has been a little bit more irritable.  His forgetfulness and difficulties with concentration are adding to his stress.  He believes that this is more associated with ADHD like symptoms.  Writer recommended starting Wellbutrin, which would address ADHD like symptoms as well as mood.  Patient is agreeable with this.  We will start this medication today, and follow-up in 1 month to see how he is feeling.    Previous Medication Trials:  Zoloft , Rexulti     Subjective      Review of Systems:   Review of Systems   Constitutional:  Negative for chills, fatigue and fever.   HENT:  Negative for congestion, hearing loss, sore throat and trouble swallowing.    Eyes:  Negative for blurred vision and double vision.   Respiratory:  Negative for cough, chest tightness and shortness of  "breath.    Cardiovascular:  Negative for chest pain and palpitations.   Gastrointestinal:  Negative for abdominal pain, constipation, diarrhea, nausea and vomiting.   Endocrine: Negative for polydipsia and polyuria.   Genitourinary:  Negative for hematuria and urgency.   Musculoskeletal:  Negative for arthralgias.   Skin:  Negative for skin lesions and bruise.   Neurological:  Negative for dizziness, tremors, seizures and light-headedness.   Hematological:  Negative for adenopathy.     Screening Scores:   PHQ-9 : 5  RAJESH-7 : 3    Medications:     Current Outpatient Medications:     albuterol sulfate  (90 Base) MCG/ACT inhaler, , Disp: , Rfl:     Ferrous Sulfate (IRON PO), 50 mg., Disp: , Rfl:     fluticasone (FLONASE) 50 MCG/ACT nasal spray, Flonase Allergy Relief 50 mcg/actuation nasal spray,suspension  2 sprays in each nostril once daily, Disp: , Rfl:     Fluticasone-Salmeterol (ADVAIR) 250-50 MCG/ACT DISKUS, Advair Diskus 250 mcg-50 mcg/dose powder for inhalation  INHALE ONE PUFF BY MOUTH TWICE A DAY, Disp: , Rfl:     montelukast (SINGULAIR) 10 MG tablet, montelukast 10 mg tablet  TAKE ONE TABLET BY MOUTH DAILY, Disp: , Rfl:     omeprazole (priLOSEC) 20 MG capsule, TAKE 1 CAPSULE BY MOUTH EVERY DAY, Disp: 90 capsule, Rfl: 0    buPROPion XL (Wellbutrin XL) 150 MG 24 hr tablet, Take 1 tablet by mouth Every Morning., Disp: 30 tablet, Rfl: 1    Medication Considerations:  DIVINA reviewed and appropriate.     Allergies:   No Known Allergies    Objective     Vital Signs:   Vitals:    11/22/23 1241   BP: 134/88   Pulse: 92   SpO2: 93%   Weight: 84.2 kg (185 lb 9.6 oz)   Height: 170.2 cm (67.01\")     Body mass index is 29.06 kg/m².     Mental Status Exam:   MENTAL STATUS EXAM   General Appearance:  Cleanly groomed and dressed  Eye Contact:  Good eye contact  Attitude:  Cooperative  Motor Activity:  Normal gait, posture  Muscle Strength:  Normal  Speech:  Normal rate, tone, volume  Language:  Spontaneous  Mood and " affect:  Normal, pleasant and appropriate  Hopelessness:  Denies  Thought Process:  Logical and goal-directed  Associations/ Thought Content:  No delusions  Hallucinations:  None  Suicidal Ideations:  Not present  Homicidal Ideation:  Not present  Sensorium:  Alert  Orientation:  Person, place, time and situation  Immediate Recall, Recent, and Remote Memory:  Intact  Attention Span/ Concentration:  Good  Fund of Knowledge:  Appropriate for age and educational level  Intellectual Functioning:  Average range  Insight:  Fair  Judgement:  Fair  Reliability:  Fair  Impulse Control:  Fair     SUICIDE RISK ASSESSMENT/CSSRS:  1. Does patient have thoughts of suicide? denies  2. Does patient have intent for suicide? denies  3. Does patient have a current plan for suicide? denies  4. History of suicide attempts: denies  5. Family history of suicide or attempts: denies  6. History of violent behaviors towards others or property or thoughts of committing suicide: denies  7. History of sexual aggression toward others: denies  8. Access to firearms or weapons: denies    Assessment / Plan      Visit Diagnosis/Orders Placed This Visit:  Diagnoses and all orders for this visit:    1. Major depressive disorder, recurrent episode, moderate degree (Primary)  - Start Wellbutrin  mg po daily  - DC Rexulti  - Follow up with writer in 1 mo  Labs reviewed: Labs from 10/30/23 reviewed : PSA elevated  MRI showed normal age related changes  Chart Reviewed      Functional Status: Mild impairment      Prognosis: Good with Ongoing Treatment     Impression/Formulation:  Patient appeared alert and oriented.  Patient is voluntarily requesting to begin outpatient therapy at [facility].  Patient is receptive to assistance with maintaining a stable lifestyle.  Patient presents with history of     ICD-10-CM ICD-9-CM   1. Major depressive disorder, recurrent episode, moderate degree  F33.1 296.32     Treatment Plan:      Patient will continue supportive psychotherapy efforts and medications as indicated. Clinic will obtain release of information for current treatment team for continuity of care as needed. Patient will contact this office, call 911 or present to the nearest emergency room should suicidal or homicidal ideations occur.  Discussed medication options and treatment plan of prescribed medication(s) as well as the risks, benefits, and potential side effects. Patient ackowledged and verbally consented to continue with current treatment plan and was educated on the importance of compliance with treatment and follow-up appointments.     Quality Measures:  Tobacco: patient denies tobacco use and declines need for further education regarding use/cessation.      Follow Up:   Return in about 1 month (around 12/22/2023) for Medication Management.    Aida Mota MD  [facility]     This is electronically signed by Aida Mota MD   11/22/2023 11:00 EST

## 2023-12-20 ENCOUNTER — OFFICE VISIT (OUTPATIENT)
Dept: NEUROLOGY | Facility: CLINIC | Age: 78
End: 2023-12-20
Payer: MEDICARE

## 2023-12-20 VITALS
OXYGEN SATURATION: 96 % | WEIGHT: 186.2 LBS | HEART RATE: 85 BPM | BODY MASS INDEX: 29.22 KG/M2 | SYSTOLIC BLOOD PRESSURE: 140 MMHG | DIASTOLIC BLOOD PRESSURE: 72 MMHG | HEIGHT: 67 IN

## 2023-12-20 DIAGNOSIS — F32.A ANXIETY AND DEPRESSION: ICD-10-CM

## 2023-12-20 DIAGNOSIS — R41.3 MEMORY LOSS: Primary | ICD-10-CM

## 2023-12-20 DIAGNOSIS — F41.9 ANXIETY AND DEPRESSION: ICD-10-CM

## 2023-12-20 PROCEDURE — 99214 OFFICE O/P EST MOD 30 MIN: CPT | Performed by: NURSE PRACTITIONER

## 2023-12-20 PROCEDURE — 1160F RVW MEDS BY RX/DR IN RCRD: CPT | Performed by: NURSE PRACTITIONER

## 2023-12-20 PROCEDURE — 1159F MED LIST DOCD IN RCRD: CPT | Performed by: NURSE PRACTITIONER

## 2023-12-20 RX ORDER — SERTRALINE HYDROCHLORIDE 25 MG/1
1 TABLET, FILM COATED ORAL DAILY
COMMUNITY
Start: 2023-12-09

## 2023-12-20 NOTE — PROGRESS NOTES
Neuro Office Visit      Encounter Date: 2023   Patient Name: Varghese Smith  : 1945   MRN: [id number]   PCP:  Flora Griffith APRN     Chief Complaint:    Chief Complaint   Patient presents with    Memory Loss       History of Present Illness: Varghese Smith is a 78 y.o. male who is here today in Neurology for memory loss.    Last visit with me 2023 MMSE 27/30, MRI of the brain ordered and referral to psychiatry.    Started on medication for ADHD and he does think this is helped his memory somewhat.    Continues to have some anxiety about things that are out of his control.    Will be getting hearing aids in hopes that that will help with some of the memory loss.    Has difficulty recalling names of known people.    Cannot recall words although he feels like the word is right on the top of his head.    I personally reviewed the MRI of the brain with Varghese.  He has some mild age-related changes but nothing significant.    MRI Brain With & Without Contrast (2023 14:18)       Subjective      Past Medical History:   Past Medical History:   Diagnosis Date    Anemia     Arthritis     Asthma     Cataract     Colon polyp     Diverticulitis     Diverticulosis     Erectile dysfunction     GERD (gastroesophageal reflux disease)     Headache     History of medical problems     HL (hearing loss) 2020    Kidney stone     Low back pain     Memory loss        Past Surgical History:   Past Surgical History:   Procedure Laterality Date    HERNIA REPAIR      JOINT REPLACEMENT      TONSILLECTOMY      TOTAL HIP ARTHROPLASTY Right     VASECTOMY         Family History:   Family History   Problem Relation Age of Onset    Arthritis Mother     Dementia Mother     Multiple sclerosis Mother        Social History:   Social History     Socioeconomic History    Marital status:    Tobacco Use    Smoking status: Never    Smokeless tobacco: Never   Vaping Use    Vaping Use: Never used   Substance  "and Sexual Activity    Alcohol use: Never    Drug use: Never    Sexual activity: Not Currently     Partners: Male     Birth control/protection: None, Vasectomy       Medications:     Current Outpatient Medications:     albuterol sulfate  (90 Base) MCG/ACT inhaler, , Disp: , Rfl:     buPROPion XL (Wellbutrin XL) 150 MG 24 hr tablet, Take 1 tablet by mouth Every Morning., Disp: 30 tablet, Rfl: 1    Ferrous Sulfate (IRON PO), 50 mg., Disp: , Rfl:     fluticasone (FLONASE) 50 MCG/ACT nasal spray, Flonase Allergy Relief 50 mcg/actuation nasal spray,suspension  2 sprays in each nostril once daily, Disp: , Rfl:     Fluticasone-Salmeterol (ADVAIR) 250-50 MCG/ACT DISKUS, Advair Diskus 250 mcg-50 mcg/dose powder for inhalation  INHALE ONE PUFF BY MOUTH TWICE A DAY, Disp: , Rfl:     montelukast (SINGULAIR) 10 MG tablet, montelukast 10 mg tablet  TAKE ONE TABLET BY MOUTH DAILY, Disp: , Rfl:     omeprazole (priLOSEC) 20 MG capsule, TAKE 1 CAPSULE BY MOUTH EVERY DAY, Disp: 90 capsule, Rfl: 0    sertraline (ZOLOFT) 25 MG tablet, Take 1 tablet by mouth Daily., Disp: , Rfl:     Allergies:   No Known Allergies    PHQ-9 Total Score:     STEADI Fall Risk Assessment was completed, and patient is at LOW risk for falls.Assessment completed on:12/20/2023    Objective     Physical Exam:   Physical Exam    Neurologic Exam     Vital Signs:   Vitals:    12/20/23 1423   BP: 140/72   Pulse: 85   SpO2: 96%   Weight: 84.5 kg (186 lb 3.2 oz)   Height: 170.2 cm (67.01\")     Body mass index is 29.15 kg/m².     Results:   Imaging:   MRI Brain With & Without Contrast    Result Date: 11/2/2023  Impression: Mild, age-appropriate generalized volume loss is present. Within the left cerebellum, there is a prominent area of vascular enhancement noted with the appearance of a developmental venous anomaly, likely representing benign anatomic variant. There is otherwise no evidence of acute ischemia, hemorrhage, mass or additional abnormal enhancement. " "Electronically Signed: Javy Navarro MD  11/2/2023 2:40 PM EDT  Workstation ID: [id number]       Labs:    No results found for: \"CMP\", \"PROTEIN\", \"ANTIMOGAB\", \"IHZGJW3AWXV\", \"JCVRESULT\", \"QUANTTBGOLD\", \"CBCDIF\", \"IGGALBSER\"     Assessment / Plan      Assessment/Plan:   Diagnoses and all orders for this visit:    1. Memory loss (Primary)  Comments:  Consider Aricept    2. Anxiety and depression           Patient Education:   Varghese has had a lot going on in his life recently and feels like his memory is getting worse so we discussed the addition of a cognitive enhancer.  He has noted a little improvement since starting medication for ADHD and would like to continue that route until he returns from [place] in the spring.  At that time if the memory loss is still going on or worsening we will consider Aricept.    Reviewed medications, potential side effects and signs and symptoms to report. Discussed risk versus benefits of treatment plan with patient and/or family-including medications, labs and radiology that may be ordered. Addressed questions and concerns during visit. Patient and/or family verbalized understanding and agree with plan. Instructed to call the office with any questions and report to ER with any life-threatening symptoms.     Follow Up:   Return in about 3 months (around 3/20/2024).    I spent 30  minutes face to face with the patient. I personally spent 50 percent of this time counseling and discussing diagnosis, diagnostic testing, driving, treatment options, and management .       During this visit the following were done:  Labs Reviewed []    Labs Ordered []    Radiology Reports Reviewed []    Radiology Ordered []    PCP Records Reviewed []    Referring Provider Records Reviewed []    ER Records Reviewed []    Hospital Records Reviewed []    History Obtained From Family []    Radiology Images Reviewed []    Other Reviewed []    Records Requested []      HONEY Newman  [facility] " BRAN  [facility]  [address] [place] [address]  [place] [address] [phone]

## 2024-01-30 ENCOUNTER — TELEPHONE (OUTPATIENT)
Age: 79
End: 2024-01-30
Payer: MEDICARE

## 2024-02-12 RX ORDER — OMEPRAZOLE 20 MG/1
CAPSULE, DELAYED RELEASE ORAL
Qty: 90 CAPSULE | Refills: 0 | OUTPATIENT
Start: 2024-02-12

## 2024-02-12 RX ORDER — OMEPRAZOLE 20 MG/1
CAPSULE, DELAYED RELEASE ORAL
Qty: 90 CAPSULE | Refills: 1 | Status: SHIPPED | OUTPATIENT
Start: 2024-02-12

## 2024-02-12 RX ORDER — TAMSULOSIN HYDROCHLORIDE 0.4 MG/1
1 CAPSULE ORAL DAILY
COMMUNITY
Start: 2024-01-22

## 2024-02-22 RX ORDER — SERTRALINE HYDROCHLORIDE 25 MG/1
TABLET, FILM COATED ORAL DAILY
Qty: 30 TABLET | Refills: 6 | Status: SHIPPED | OUTPATIENT
Start: 2024-02-22

## 2024-02-22 NOTE — TELEPHONE ENCOUNTER
Rx Refill Note  Requested Prescriptions     Pending Prescriptions Disp Refills    sertraline (ZOLOFT) 25 MG tablet [Pharmacy Med Name: SERTRALINE 25MG TABLETS] 30 tablet      Sig: TAKE 1 TABLET BY MOUTH DAILY      Last filled: 12/09/2023  Last office visit with prescribing clinician: 12/20/2023      Next office visit with prescribing clinician: 5/8/2024     Caity Nguyễn MA  02/22/24, 11:34 EST

## 2024-05-01 ENCOUNTER — OFFICE VISIT (OUTPATIENT)
Age: 79
End: 2024-05-01
Payer: MEDICARE

## 2024-05-01 VITALS
WEIGHT: 187.2 LBS | DIASTOLIC BLOOD PRESSURE: 72 MMHG | OXYGEN SATURATION: 94 % | HEART RATE: 74 BPM | BODY MASS INDEX: 29.31 KG/M2 | TEMPERATURE: 98 F | SYSTOLIC BLOOD PRESSURE: 134 MMHG

## 2024-05-01 DIAGNOSIS — S51.812D SKIN TEAR OF LEFT FOREARM WITHOUT COMPLICATION, SUBSEQUENT ENCOUNTER: Primary | ICD-10-CM

## 2024-05-01 PROCEDURE — 99213 OFFICE O/P EST LOW 20 MIN: CPT | Performed by: NURSE PRACTITIONER

## 2024-05-01 NOTE — PROGRESS NOTES
"Chief Complaint  Fall (1 month ago, hurt L arm, arm is bandaged went to urgent care ) and Elevated PSA (Follow up )    Subjective        Varghese Smith presents to [facility]  History of Present Illness  T is here today to follow up for skin tear on his left arm. He was seen at  and treated with mesh. He went back after 8 days to have bandage replaced. They advised him to be seen in 10 days to remove dressing and for evaluation. Wound appears to be healing well. Removed mesh dressing with normal saline. Replaced with non-stick pad and Bactroban ointment. Wrapped with self stick bandage. Advised pt to change dressing daily and sent Bactroban ointment to pharmacy.       Objective   Vital Signs:  /72   Pulse 74   Temp 98 °F (36.7 °C)   Wt 84.9 kg (187 lb 3.2 oz)   SpO2 94%   BMI 29.31 kg/m²   Estimated body mass index is 29.31 kg/m² as calculated from the following:    Height as of 12/20/23: 170.2 cm (67.01\").    Weight as of this encounter: 84.9 kg (187 lb 3.2 oz).               Physical Exam  Vitals reviewed.   Constitutional:       Appearance: Normal appearance.   HENT:      Nose: Nose normal.      Mouth/Throat:      Pharynx: Oropharynx is clear.   Cardiovascular:      Rate and Rhythm: Normal rate and regular rhythm.      Heart sounds: Normal heart sounds.   Pulmonary:      Effort: Pulmonary effort is normal.      Breath sounds: Normal breath sounds.   Skin:     Comments: Skin tear on left forearm   Neurological:      Mental Status: He is alert and oriented to person, place, and time.   Psychiatric:         Mood and Affect: Mood normal.         Behavior: Behavior normal.         Thought Content: Thought content normal.        Result Review :                     Assessment and Plan     Diagnoses and all orders for this visit:    1. Skin tear of left forearm without complication, subsequent encounter (Primary)  -     mupirocin (BACTROBAN) 2 % ointment; Apply 1 Application " topically to the appropriate area as directed 3 (Three) Times a Day. For up to 2 weeks  Dispense: 30 g; Refill: 1             Follow Up     Return in about 6 months (around 11/1/2024) for Medicare Wellness already scheduled.  Patient was given instructions and counseling regarding his condition or for health maintenance advice. Please see specific information pulled into the AVS if appropriate.         Answers submitted by the patient for this visit:  Primary Reason for Visit (Submitted on 4/24/2024)  What is the primary reason for your visit?: Other  Other (Submitted on 4/24/2024)  Please describe your symptoms.: Contusion on the arm.  Have you had these symptoms before?: No  How long have you been having these symptoms?: 1-2 weeks  Please list any medications you are currently taking for this condition.: Keflex antibiotics  Please describe any probable cause for these symptoms. : A fall

## 2024-05-08 ENCOUNTER — OFFICE VISIT (OUTPATIENT)
Age: 79
End: 2024-05-08
Payer: MEDICARE

## 2024-05-08 VITALS
HEIGHT: 67 IN | SYSTOLIC BLOOD PRESSURE: 120 MMHG | WEIGHT: 186.8 LBS | HEART RATE: 76 BPM | OXYGEN SATURATION: 92 % | DIASTOLIC BLOOD PRESSURE: 66 MMHG | BODY MASS INDEX: 29.32 KG/M2

## 2024-05-08 DIAGNOSIS — L25.9 CONTACT DERMATITIS, UNSPECIFIED CONTACT DERMATITIS TYPE, UNSPECIFIED TRIGGER: Primary | ICD-10-CM

## 2024-05-08 PROCEDURE — 99213 OFFICE O/P EST LOW 20 MIN: CPT | Performed by: NURSE PRACTITIONER

## 2024-05-08 PROCEDURE — 1125F AMNT PAIN NOTED PAIN PRSNT: CPT | Performed by: NURSE PRACTITIONER

## 2024-05-08 RX ORDER — TRIAMCINOLONE ACETONIDE 1 MG/G
1 OINTMENT TOPICAL 2 TIMES DAILY
Qty: 30 G | Refills: 0 | Status: SHIPPED | OUTPATIENT
Start: 2024-05-08

## 2024-07-22 RX ORDER — OMEPRAZOLE 20 MG/1
CAPSULE, DELAYED RELEASE ORAL
Qty: 90 CAPSULE | Refills: 1 | Status: SHIPPED | OUTPATIENT
Start: 2024-07-22

## 2024-07-22 NOTE — TELEPHONE ENCOUNTER
Rx Refill Note  Requested Prescriptions     Pending Prescriptions Disp Refills    omeprazole (priLOSEC) 20 MG capsule [Pharmacy Med Name: OMEPRAZOLE 20MG CAPSULES] 90 capsule 1     Sig: TAKE 1 CAPSULE BY MOUTH EVERY DAY      Last office visit with prescribing clinician: 5/8/2024   Last telemedicine visit with prescribing clinician: Visit date not found   Next office visit with prescribing clinician: 10/31/2024                         Would you like a call back once the refill request has been completed: [] Yes [] No    If the office needs to give you a call back, can they leave a voicemail: [] Yes [] No    Diaz Romo MA  07/22/24, 10:21 EDT

## 2024-07-23 PROCEDURE — 87205 SMEAR GRAM STAIN: CPT | Performed by: NURSE PRACTITIONER

## 2024-07-23 PROCEDURE — 87070 CULTURE OTHR SPECIMN AEROBIC: CPT | Performed by: NURSE PRACTITIONER

## 2024-07-24 ENCOUNTER — PATIENT ROUNDING (BHMG ONLY) (OUTPATIENT)
Dept: URGENT CARE | Facility: CLINIC | Age: 79
End: 2024-07-24
Payer: MEDICARE

## 2024-10-31 ENCOUNTER — LAB (OUTPATIENT)
Age: 79
End: 2024-10-31
Payer: MEDICARE

## 2024-10-31 ENCOUNTER — OFFICE VISIT (OUTPATIENT)
Age: 79
End: 2024-10-31
Payer: MEDICARE

## 2024-10-31 VITALS
WEIGHT: 184.1 LBS | HEART RATE: 79 BPM | HEIGHT: 67 IN | DIASTOLIC BLOOD PRESSURE: 66 MMHG | SYSTOLIC BLOOD PRESSURE: 136 MMHG | BODY MASS INDEX: 28.9 KG/M2 | OXYGEN SATURATION: 96 %

## 2024-10-31 DIAGNOSIS — E55.9 VITAMIN D DEFICIENCY: ICD-10-CM

## 2024-10-31 DIAGNOSIS — R73.9 HYPERGLYCEMIA: ICD-10-CM

## 2024-10-31 DIAGNOSIS — E78.2 MIXED HYPERLIPIDEMIA: ICD-10-CM

## 2024-10-31 DIAGNOSIS — R41.3 MEMORY CHANGE: ICD-10-CM

## 2024-10-31 DIAGNOSIS — E53.8 VITAMIN B12 DEFICIENCY: ICD-10-CM

## 2024-10-31 DIAGNOSIS — Z00.00 HEALTHCARE MAINTENANCE: Primary | ICD-10-CM

## 2024-10-31 LAB
25(OH)D3 SERPL-MCNC: 27.6 NG/ML (ref 30–100)
BASOPHILS # BLD AUTO: 0.03 10*3/MM3 (ref 0–0.2)
BASOPHILS NFR BLD AUTO: 0.8 % (ref 0–1.5)
DEPRECATED RDW RBC AUTO: 46.8 FL (ref 37–54)
EOSINOPHIL # BLD AUTO: 0.04 10*3/MM3 (ref 0–0.4)
EOSINOPHIL NFR BLD AUTO: 1 % (ref 0.3–6.2)
ERYTHROCYTE [DISTWIDTH] IN BLOOD BY AUTOMATED COUNT: 12.7 % (ref 12.3–15.4)
HCT VFR BLD AUTO: 41.5 % (ref 37.5–51)
HGB BLD-MCNC: 14 G/DL (ref 13–17.7)
IMM GRANULOCYTES # BLD AUTO: 0.01 10*3/MM3 (ref 0–0.05)
IMM GRANULOCYTES NFR BLD AUTO: 0.3 % (ref 0–0.5)
LYMPHOCYTES # BLD AUTO: 0.76 10*3/MM3 (ref 0.7–3.1)
LYMPHOCYTES NFR BLD AUTO: 19.1 % (ref 19.6–45.3)
MCH RBC QN AUTO: 33.7 PG (ref 26.6–33)
MCHC RBC AUTO-ENTMCNC: 33.7 G/DL (ref 31.5–35.7)
MCV RBC AUTO: 99.8 FL (ref 79–97)
MONOCYTES # BLD AUTO: 0.37 10*3/MM3 (ref 0.1–0.9)
MONOCYTES NFR BLD AUTO: 9.3 % (ref 5–12)
NEUTROPHILS NFR BLD AUTO: 2.77 10*3/MM3 (ref 1.7–7)
NEUTROPHILS NFR BLD AUTO: 69.5 % (ref 42.7–76)
NRBC BLD AUTO-RTO: 0 /100 WBC (ref 0–0.2)
PLATELET # BLD AUTO: 187 10*3/MM3 (ref 140–450)
PMV BLD AUTO: 10.3 FL (ref 6–12)
RBC # BLD AUTO: 4.16 10*6/MM3 (ref 4.14–5.8)
VIT B12 BLD-MCNC: 320 PG/ML (ref 211–946)
WBC NRBC COR # BLD AUTO: 3.98 10*3/MM3 (ref 3.4–10.8)

## 2024-10-31 PROCEDURE — 1160F RVW MEDS BY RX/DR IN RCRD: CPT | Performed by: STUDENT IN AN ORGANIZED HEALTH CARE EDUCATION/TRAINING PROGRAM

## 2024-10-31 PROCEDURE — 1170F FXNL STATUS ASSESSED: CPT | Performed by: STUDENT IN AN ORGANIZED HEALTH CARE EDUCATION/TRAINING PROGRAM

## 2024-10-31 PROCEDURE — 80053 COMPREHEN METABOLIC PANEL: CPT | Performed by: STUDENT IN AN ORGANIZED HEALTH CARE EDUCATION/TRAINING PROGRAM

## 2024-10-31 PROCEDURE — 82607 VITAMIN B-12: CPT | Performed by: STUDENT IN AN ORGANIZED HEALTH CARE EDUCATION/TRAINING PROGRAM

## 2024-10-31 PROCEDURE — 84443 ASSAY THYROID STIM HORMONE: CPT | Performed by: STUDENT IN AN ORGANIZED HEALTH CARE EDUCATION/TRAINING PROGRAM

## 2024-10-31 PROCEDURE — 1126F AMNT PAIN NOTED NONE PRSNT: CPT | Performed by: STUDENT IN AN ORGANIZED HEALTH CARE EDUCATION/TRAINING PROGRAM

## 2024-10-31 PROCEDURE — 82306 VITAMIN D 25 HYDROXY: CPT | Performed by: STUDENT IN AN ORGANIZED HEALTH CARE EDUCATION/TRAINING PROGRAM

## 2024-10-31 PROCEDURE — 80061 LIPID PANEL: CPT | Performed by: STUDENT IN AN ORGANIZED HEALTH CARE EDUCATION/TRAINING PROGRAM

## 2024-10-31 PROCEDURE — 36415 COLL VENOUS BLD VENIPUNCTURE: CPT | Performed by: STUDENT IN AN ORGANIZED HEALTH CARE EDUCATION/TRAINING PROGRAM

## 2024-10-31 PROCEDURE — G0439 PPPS, SUBSEQ VISIT: HCPCS | Performed by: STUDENT IN AN ORGANIZED HEALTH CARE EDUCATION/TRAINING PROGRAM

## 2024-10-31 PROCEDURE — 85025 COMPLETE CBC W/AUTO DIFF WBC: CPT | Performed by: STUDENT IN AN ORGANIZED HEALTH CARE EDUCATION/TRAINING PROGRAM

## 2024-10-31 PROCEDURE — 1159F MED LIST DOCD IN RCRD: CPT | Performed by: STUDENT IN AN ORGANIZED HEALTH CARE EDUCATION/TRAINING PROGRAM

## 2024-10-31 PROCEDURE — 83036 HEMOGLOBIN GLYCOSYLATED A1C: CPT | Performed by: STUDENT IN AN ORGANIZED HEALTH CARE EDUCATION/TRAINING PROGRAM

## 2024-10-31 RX ORDER — FLUTICASONE PROPIONATE 50 MCG
2 SPRAY, SUSPENSION (ML) NASAL DAILY
Qty: 16 G | Refills: 11 | Status: SHIPPED | OUTPATIENT
Start: 2024-10-31

## 2024-10-31 NOTE — PROGRESS NOTES
Subjective   The ABCs of the Annual Wellness Visit  Medicare Wellness Visit      Varghese Smith is a 79 y.o. patient who presents for a Medicare Wellness Visit. He is doing well and has no complaints    The following portions of the patient's history were reviewed and   updated as appropriate: allergies, current medications, past family history, past medical history, past social history, past surgical history, and problem list.    Compared to one year ago, the patient's physical   health is better.  Compared to one year ago, the patient's mental   health is worse.    Recent Hospitalizations:  He was not admitted to the hospital during the last year.     Current Medical Providers:  Patient Care Team:  Flora Griffith APRN as PCP - General (Nurse Practitioner)  Aida Mota MD as Consulting Physician (Psychiatry)  Zenon Beauchamp MD as Consulting Physician (Urology)  Aida Amezcua PA (Physician Assistant)    Outpatient Medications Prior to Visit   Medication Sig Dispense Refill    albuterol sulfate  (90 Base) MCG/ACT inhaler       Ferrous Sulfate (IRON PO) 50 mg.      Fluticasone-Salmeterol (ADVAIR) 250-50 MCG/ACT DISKUS Advair Diskus 250 mcg-50 mcg/dose powder for inhalation   INHALE ONE PUFF BY MOUTH TWICE A DAY      montelukast (SINGULAIR) 10 MG tablet montelukast 10 mg tablet   TAKE ONE TABLET BY MOUTH DAILY      mupirocin (BACTROBAN) 2 % ointment Apply 1 Application topically to the appropriate area as directed 3 (Three) Times a Day. For up to 2 weeks 30 g 1    omeprazole (priLOSEC) 20 MG capsule TAKE 1 CAPSULE BY MOUTH EVERY DAY 90 capsule 1    tamsulosin (FLOMAX) 0.4 MG capsule 24 hr capsule Take 1 capsule by mouth Daily.      fluticasone (FLONASE) 50 MCG/ACT nasal spray Flonase Allergy Relief 50 mcg/actuation nasal spray,suspension   2 sprays in each nostril once daily      buPROPion XL (Wellbutrin XL) 150 MG 24 hr tablet Take 1 tablet by mouth Every Morning. 30 tablet 1    sertraline (ZOLOFT)  "25 MG tablet TAKE 1 TABLET BY MOUTH DAILY 30 tablet 6    triamcinolone (KENALOG) 0.1 % ointment Apply 1 Application topically to the appropriate area as directed 2 (Two) Times a Day. 30 g 0     No facility-administered medications prior to visit.     No opioid medication identified on active medication list. I have reviewed chart for other potential  high risk medication/s and harmful drug interactions in the elderly.      Aspirin is not on active medication list.  Aspirin use is not indicated based on review of current medical condition/s. Risk of harm outweighs potential benefits.  .    Patient Active Problem List   Diagnosis    Anemia    Asthma    Chronic rhinitis    Vitamin D deficiency    Diverticulosis    GERD (gastroesophageal reflux disease)    Low back pain    Prostate hypertrophy    Raised prostate specific antigen    Arthralgia of hip    Arthralgia of right knee    Cervical radiculopathy    Lower urinary tract symptoms due to benign prostatic hyperplasia    Mild intermittent asthma    Chronic rhinitis    High prostate specific antigen (PSA)     Advance Care Planning Advance Directive is not on file.  ACP discussion was held with the patient during this visit. Patient has an advance directive (not in EMR), copy requested.            Objective   Vitals:    10/31/24 1350   BP: 136/66   BP Location: Right arm   Patient Position: Sitting   Cuff Size: Adult   Pulse: 79   SpO2: 96%   Weight: 83.5 kg (184 lb 1.6 oz)   Height: 170.2 cm (67.01\")   PainSc: 0-No pain       Estimated body mass index is 28.83 kg/m² as calculated from the following:    Height as of this encounter: 170.2 cm (67.01\").    Weight as of this encounter: 83.5 kg (184 lb 1.6 oz).            Does the patient have evidence of cognitive impairment? No                                                                                               Health  Risk Assessment    Smoking Status:  Social History     Tobacco Use   Smoking Status Never    " Passive exposure: Never   Smokeless Tobacco Never     Alcohol Consumption:  Social History     Substance and Sexual Activity   Alcohol Use Never       Fall Risk Screen  STEADI Fall Risk Assessment was completed, and patient is at LOW risk for falls.Assessment completed on:10/31/2024    Depression Screening:      10/31/2024     1:52 PM   PHQ-2/PHQ-9 Depression Screening   Little interest or pleasure in doing things Not at all   Feeling down, depressed, or hopeless Not at all     Health Habits and Functional and Cognitive Screening:      10/31/2024     1:51 PM   Functional & Cognitive Status   Do you have difficulty preparing food and eating? No   Do you have difficulty bathing yourself, getting dressed or grooming yourself? No   Do you have difficulty using the toilet? No   Do you have difficulty moving around from place to place? No   Do you have trouble with steps or getting out of a bed or a chair? No   Current Diet Well Balanced Diet   Dental Exam Up to date   Eye Exam Up to date   Exercise (times per week) 3 times per week   Current Exercises Include Walking   Do you need help using the phone?  No   Are you deaf or do you have serious difficulty hearing?  Yes   Do you need help to go to places out of walking distance? No   Do you need help shopping? No   Do you need help preparing meals?  No   Do you need help with housework?  No   Do you need help with laundry? No   Do you need help taking your medications? No   Do you need help managing money? No   Do you ever drive or ride in a car without wearing a seat belt? No   Have you felt unusual stress, anger or loneliness in the last month? Yes   Who do you live with? Spouse   If you need help, do you have trouble finding someone available to you? No   Have you been bothered in the last four weeks by sexual problems? No   Do you have difficulty concentrating, remembering or making decisions? Yes           Age-appropriate Screening Schedule:  Refer to the list below  for future screening recommendations based on patient's age, sex and/or medical conditions. Orders for these recommended tests are listed in the plan section. The patient has been provided with a written plan.    Health Maintenance List  Health Maintenance   Topic Date Due    ZOSTER VACCINE (2 of 3) 04/16/2013    RSV Vaccine - Adults (1 - 1-dose 75+ series) Never done    LIPID PANEL  10/30/2024    COVID-19 Vaccine (3 - 2023-24 season) 11/02/2024 (Originally 9/1/2024)    ANNUAL WELLNESS VISIT  10/31/2025    BMI FOLLOWUP  10/31/2025    TDAP/TD VACCINES (4 - Td or Tdap) 09/04/2033    INFLUENZA VACCINE  Completed    Pneumococcal Vaccine 65+  Completed                                                                                                                                                CMS Preventative Services Quick Reference  Risk Factors Identified During Encounter  None Identified    The above risks/problems have been discussed with the patient.  Pertinent information has been shared with the patient in the After Visit Summary.  An After Visit Summary and PPPS were made available to the patient.    Follow Up:   Next Medicare Wellness visit to be scheduled in 1 year.     Assessment & Plan  Vitamin D deficiency    Orders:    Vitamin D,25-Hydroxy; Future    Vitamin B12 deficiency    Orders:    Vitamin B12; Future    Hyperglycemia    Orders:    Comprehensive Metabolic Panel; Future    CBC Auto Differential; Future    Hemoglobin A1c; Future    Lipid Panel; Future    TSH Rfx On Abnormal To Free T4; Future    Mixed hyperlipidemia   Check screening labs    Orders:    Lipid Panel; Future    Memory change  Minor, reports word recall difficulty  Orders:    TSH Rfx On Abnormal To Free T4; Future    Healthcare maintenance  Aged out of most screenings  Check screening labs today              Follow Up:   Return in about 1 year (around 10/31/2025) for Medicare Wellness.

## 2024-11-01 LAB
ALBUMIN SERPL-MCNC: 3.7 G/DL (ref 3.5–5.2)
ALBUMIN/GLOB SERPL: 1.5 G/DL
ALP SERPL-CCNC: 73 U/L (ref 39–117)
ALT SERPL W P-5'-P-CCNC: 19 U/L (ref 1–41)
ANION GAP SERPL CALCULATED.3IONS-SCNC: 8.8 MMOL/L (ref 5–15)
AST SERPL-CCNC: 28 U/L (ref 1–40)
BILIRUB SERPL-MCNC: 0.6 MG/DL (ref 0–1.2)
BUN SERPL-MCNC: 10 MG/DL (ref 8–23)
BUN/CREAT SERPL: 9.5 (ref 7–25)
CALCIUM SPEC-SCNC: 8.7 MG/DL (ref 8.6–10.5)
CHLORIDE SERPL-SCNC: 107 MMOL/L (ref 98–107)
CHOLEST SERPL-MCNC: 141 MG/DL (ref 0–200)
CO2 SERPL-SCNC: 26.2 MMOL/L (ref 22–29)
CREAT SERPL-MCNC: 1.05 MG/DL (ref 0.76–1.27)
EGFRCR SERPLBLD CKD-EPI 2021: 71.8 ML/MIN/1.73
GLOBULIN UR ELPH-MCNC: 2.5 GM/DL
GLUCOSE SERPL-MCNC: 113 MG/DL (ref 65–99)
HBA1C MFR BLD: 5.4 % (ref 4.8–5.6)
HDLC SERPL-MCNC: 53 MG/DL (ref 40–60)
LDLC SERPL CALC-MCNC: 75 MG/DL (ref 0–100)
LDLC/HDLC SERPL: 1.41 {RATIO}
POTASSIUM SERPL-SCNC: 4.2 MMOL/L (ref 3.5–5.2)
PROT SERPL-MCNC: 6.2 G/DL (ref 6–8.5)
SODIUM SERPL-SCNC: 142 MMOL/L (ref 136–145)
TRIGL SERPL-MCNC: 66 MG/DL (ref 0–150)
TSH SERPL DL<=0.05 MIU/L-ACNC: 2.1 UIU/ML (ref 0.27–4.2)
VLDLC SERPL-MCNC: 13 MG/DL (ref 5–40)

## 2025-06-27 ENCOUNTER — HOSPITAL ENCOUNTER (EMERGENCY)
Facility: HOSPITAL | Age: 80
Discharge: HOME | End: 2025-06-27
Attending: EMERGENCY MEDICINE
Payer: MEDICARE

## 2025-06-27 VITALS
RESPIRATION RATE: 16 BRPM | HEART RATE: 74 BPM | TEMPERATURE: 98.9 F | WEIGHT: 180 LBS | BODY MASS INDEX: 28.25 KG/M2 | SYSTOLIC BLOOD PRESSURE: 133 MMHG | OXYGEN SATURATION: 100 % | DIASTOLIC BLOOD PRESSURE: 89 MMHG | HEIGHT: 67 IN
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
## 2025-06-27 DIAGNOSIS — T14.8XXA MULTIPLE SKIN TEARS: Primary | ICD-10-CM

## 2025-06-27 PROCEDURE — 99282 EMERGENCY DEPT VISIT SF MDM: CPT

## 2025-06-27 RX ORDER — MUPIROCIN 2 %
1 OINTMENT (GRAM) TOPICAL 3 TIMES DAILY
Qty: 1 EACH | Refills: 0 | Status: SHIPPED | OUTPATIENT
Start: 2025-06-27

## 2025-06-27 RX ORDER — MUPIROCIN 2 %
1 OINTMENT (GRAM) TOPICAL 3 TIMES DAILY
Qty: 1 EACH | Refills: 0 | Status: SHIPPED | OUTPATIENT
Start: 2025-06-27 | End: 2025-06-27

## 2025-06-27 NOTE — ED PROVIDER NOTES
Subjective   History of Present Illness  Pt is a 79 yo male presenting to ED after a fall. PMHx significant for Anemia, BPH, Diverticulosis, Glaucoma, Kidney stones, Asthma and memory loss. Pt was walking this morning and tripped on uneven ground. He fell landing on left side and suffered abrasions to left arm and left leg. He went to [facility] and sent to ED for evaluation. Pt has multiple skin tears. He denies joint pain or difficulty moving his joints. No difficulty ambulating. Pt reports Tdap UTD. He denies hitting his head. Denies neck or back pain. Denies taking blood thinners. Denies CP, SOB or dizziness prior to fall. Acting normal per family. No tobacco or ETOH use.     History provided by:  Medical records, relative and patient      Review of Systems   Respiratory:  Negative for shortness of breath.    Cardiovascular:  Negative for chest pain.   Gastrointestinal:  Negative for abdominal pain, nausea and vomiting.   Musculoskeletal:  Negative for arthralgias, back pain, joint swelling and neck pain.   Skin:  Positive for wound.   Neurological:  Negative for dizziness, weakness, numbness and headaches.       Past Medical History:   Diagnosis Date    Anemia     Arthritis     Asthma     Benign prostatic hyperplasia     Cataract 2020    Colon polyp     Diverticulitis     Diverticulosis     Erectile dysfunction     GERD (gastroesophageal reflux disease)     Glaucoma     Headache     History of medical problems     HL (hearing loss) 01/01/2020    Kidney stone     Low back pain 2010    Memory loss 2020       No Known Allergies    Past Surgical History:   Procedure Laterality Date    COLONOSCOPY  2019    HERNIA REPAIR      JOINT REPLACEMENT      TONSILLECTOMY  1952    TOTAL HIP ARTHROPLASTY Right     VASECTOMY         Family History   Problem Relation Age of Onset    Arthritis Mother     Dementia Mother     Multiple sclerosis Mother        Social History     Socioeconomic History    Marital status:    Tobacco Use     Smoking status: Never     Passive exposure: Never    Smokeless tobacco: Never   Vaping Use    Vaping status: Never Used   Substance and Sexual Activity    Alcohol use: Never    Drug use: Never    Sexual activity: Not Currently     Partners: Female     Birth control/protection: Vasectomy           Objective   Physical Exam  Vitals and nursing note reviewed.   Constitutional:       General: He is not in acute distress.  HENT:      Head: Atraumatic.      Nose: Nose normal.   Eyes:      Conjunctiva/sclera: Conjunctivae normal.   Cardiovascular:      Rate and Rhythm: Normal rate.   Pulmonary:      Effort: Pulmonary effort is normal.   Musculoskeletal:         General: Normal range of motion.      Left shoulder: No tenderness. Normal range of motion.      Left elbow: Normal range of motion. No tenderness.      Left wrist: No tenderness. Normal range of motion.      Cervical back: Normal range of motion and neck supple. No tenderness. Normal range of motion.      Thoracic back: No tenderness. Normal range of motion.      Lumbar back: No tenderness. Normal range of motion.      Right hip: No tenderness. Normal range of motion.      Left hip: No tenderness. Normal range of motion.      Right knee: Normal range of motion. No tenderness.      Left knee: Normal range of motion. No tenderness.   Skin:     General: Skin is warm.      Findings: Signs of injury present.      Comments: Multiple small to large skin tears to left arm around elbow and left leg / knee.   No deep lacerations.    Neurological:      General: No focal deficit present.      Mental Status: He is alert and oriented to person, place, and time.      Sensory: No sensory deficit.      Motor: No weakness.   Psychiatric:         Mood and Affect: Mood normal.         Behavior: Behavior normal.         Procedures           ED Course  ED Course as of 06/27/25 1916 Fri Jun 27, 2025   1108  I personally reviewed and interpreted vitals. [RT]   1141 Offered xrays but  "patient declines. He does not have any significant joint pain with movement including no difficulty moving left elbow. He is able to ambulate without difficulty.     Wounds cleaned and dressed by nursing staff.     Went over wound care and f/u with patient / family.  [RT]      ED Course User Index  [RT] Berta Cantu PA        No results found for this or any previous visit (from the past 24 hours).  Note: In addition to lab results from this visit, the labs listed above may include labs taken at another facility or during a different encounter within the last 24 hours. Please correlate lab times with ED admission and discharge times for further clarification of the services performed during this visit.    No orders to display     Vitals:    06/27/25 1045   BP: 133/89   BP Location: Left arm   Patient Position: Sitting   Pulse: 74   Resp: 16   Temp: 98.9 °F (37.2 °C)   TempSrc: Oral   SpO2: 100%   Weight: 81.6 kg (180 lb)   Height: 170.2 cm (67\")     Medications - No data to display  ECG/EMG Results (last 24 hours)       ** No results found for the last 24 hours. **          No orders to display       DISCHARGE    Patient discharged in stable condition.    Reviewed implications of results, diagnosis, meds, responsibility to follow up, warning signs and symptoms of possible worsening, potential complications and reasons to return to ER.    Patient/Family voiced understanding of above instructions.    Discussed plan for discharge, as there is no emergent indication for admission.  Pt/family is agreeable and understands need for follow up and possible repeat testing.  Pt/family is aware that discharge does not mean that nothing is wrong but that it indicates no emergency is currently present that requires admission and they must continue care with follow-up as given below or with a physician of their choice.     FOLLOW-UP  Flora Griffith, APRN  [address]  [address] " [id number]  [phone]    Schedule an appointment as soon as possible for a visit       [facility] EMERGENCY DEPARTMENT  [address]  [facility] [address] [phone]    If symptoms worsen         Medication List        New Prescriptions      mupirocin 2 % ointment  Commonly known as: BACTROBAN  Apply 1 Application topically to the appropriate area as directed 3 (Three) Times a Day.               Where to Get Your Medications        These medications were sent to [id number] - [place], [place] - [address]  [address] - [phone]  - [phone] FX  [address]  [address], [facility] [address]      Phone: [phone]   mupirocin 2 % ointment                                                      Medical Decision Making  Pt is a 79 yo male presenting to ED with complaints of skin tears after a fall. I had a discussion with the patient / family regarding ED course, diagnosis, diagnostic results and treatment plan including medications and admission / discharge. Discussed if new or worse symptoms / concerns to return to ED for further evaluation. Discussed need for close follow up with PCP / specialists.    DDx  Fracture, Sprain, contusion, laceration, abrasions, skin tears, spinal injury, concussion     Problems Addressed:  Fall, initial encounter: complicated acute illness or injury  Multiple skin tears: complicated acute illness or injury    Amount and/or Complexity of Data Reviewed  Independent Historian: spouse  External Data Reviewed: notes.     Details: Reviewed previous non ED visits including prior labs, imaging, available notes, medications, allergies and surgical hx.   [facility] PTA for fall injuries    Risk  Prescription drug management.        Final diagnoses:   Multiple skin tears   Fall, initial encounter       ED Disposition  ED Disposition       ED Disposition   Discharge    Condition   Stable    Comment   --                Flora Griffith, APRN  [address]  [address]  [facility] [address]  [phone]    Schedule an appointment as soon as possible for a visit       [facility] EMERGENCY DEPARTMENT  [address]  [facility] [address] [phone]    If symptoms worsen         Medication List        New Prescriptions      mupirocin 2 % ointment  Commonly known as: BACTROBAN  Apply 1 Application topically to the appropriate area as directed 3 (Three) Times a Day.               Where to Get Your Medications        These medications were sent to [id number] - [place], [place] - [address]  AT [address] - [phone]  - [phone]   [address]  [address] [address]      Phone: [phone]   mupirocin 2 % ointment            Berta Cantu PA  06/27/25 4776

## 2025-06-28 ENCOUNTER — PATIENT ROUNDING (BHMG ONLY) (OUTPATIENT)
Dept: URGENT CARE | Facility: CLINIC | Age: 80
End: 2025-06-28
Payer: MEDICARE

## 2025-06-29 ENCOUNTER — HOSPITAL ENCOUNTER (EMERGENCY)
Facility: HOSPITAL | Age: 80
Discharge: HOME | End: 2025-06-29
Attending: EMERGENCY MEDICINE | Admitting: EMERGENCY MEDICINE
Payer: MEDICARE

## 2025-06-29 VITALS
HEART RATE: 73 BPM | BODY MASS INDEX: 28.25 KG/M2 | TEMPERATURE: 98.3 F | SYSTOLIC BLOOD PRESSURE: 169 MMHG | RESPIRATION RATE: 16 BRPM | HEIGHT: 67 IN | OXYGEN SATURATION: 97 % | DIASTOLIC BLOOD PRESSURE: 101 MMHG | WEIGHT: 180 LBS

## 2025-06-29 DIAGNOSIS — S80.812D: ICD-10-CM

## 2025-06-29 DIAGNOSIS — S50.812D ABRASION OF LEFT FOREARM, SUBSEQUENT ENCOUNTER: Primary | ICD-10-CM

## 2025-06-29 PROCEDURE — 99283 EMERGENCY DEPT VISIT LOW MDM: CPT

## 2025-06-29 RX ORDER — DIAPER,BRIEF,INFANT-TODD,DISP
1 EACH MISCELLANEOUS EVERY 12 HOURS SCHEDULED
Status: DISCONTINUED | OUTPATIENT
Start: 2025-06-29 | End: 2025-06-29 | Stop reason: HOSPADM

## 2025-06-29 RX ADMIN — Medication 0.9 G: at 10:48

## 2025-06-29 NOTE — ED PROVIDER NOTES
Subjective   History of Present Illness  80-year-old male presents emergency department today complaining of dressings being adhered to his wounds.  He was seen here 2 days ago had abrasions to the left forearm of the left calf.  They were dressed with Xeroform gauze.  He used water to try to loosen the bandages but unable to do so.  States that last night he did put Bactroban on the dressings but has not attempted to take them off again.      History provided by:  Patient   used: No    Wound Check  Location:  Large abrasion to left forearm and left calf  Quality:  No pain until he tries to remove the dressing  Associated symptoms: no chest pain, no fever, no rash, no shortness of breath and no wheezing        Review of Systems   Constitutional:  Negative for fever.   Respiratory:  Negative for chest tightness, shortness of breath and wheezing.    Cardiovascular:  Negative for chest pain and palpitations.   Genitourinary:  Negative for dysuria, frequency and urgency.   Skin:  Negative for rash.       Past Medical History:   Diagnosis Date    Anemia     Arthritis     Asthma     Benign prostatic hyperplasia     Cataract 2020    Colon polyp     Diverticulitis     Diverticulosis     Erectile dysfunction     GERD (gastroesophageal reflux disease)     Glaucoma     Headache     History of medical problems     HL (hearing loss) 01/01/2020    Kidney stone     Low back pain 2010    Memory loss 2020       No Known Allergies    Past Surgical History:   Procedure Laterality Date    COLONOSCOPY  2019    HERNIA REPAIR      JOINT REPLACEMENT      TONSILLECTOMY  1952    TOTAL HIP ARTHROPLASTY Right     VASECTOMY         Family History   Problem Relation Age of Onset    Arthritis Mother     Dementia Mother     Multiple sclerosis Mother        Social History     Socioeconomic History    Marital status:    Tobacco Use    Smoking status: Never     Passive exposure: Never    Smokeless tobacco: Never   Vaping  Use    Vaping status: Never Used   Substance and Sexual Activity    Alcohol use: Never    Drug use: Never    Sexual activity: Not Currently     Partners: Female     Birth control/protection: Vasectomy           Objective   Physical Exam  Vitals and nursing note reviewed.   Constitutional:       Appearance: He is well-developed.   HENT:      Head: Normocephalic and atraumatic.      Right Ear: External ear normal.      Left Ear: External ear normal.      Nose: Nose normal.   Eyes:      General: No scleral icterus.     Conjunctiva/sclera: Conjunctivae normal.      Pupils: Pupils are equal, round, and reactive to light.   Neck:      Thyroid: No thyromegaly.   Cardiovascular:      Rate and Rhythm: Normal rate and regular rhythm.      Heart sounds: Normal heart sounds.   Pulmonary:      Effort: Pulmonary effort is normal. No respiratory distress.      Breath sounds: Normal breath sounds. No wheezing or rales.   Chest:      Chest wall: No tenderness.   Abdominal:      General: Bowel sounds are normal. There is no distension.      Palpations: Abdomen is soft.      Tenderness: There is no abdominal tenderness.   Musculoskeletal:         General: Normal range of motion.      Cervical back: Normal range of motion.   Lymphadenopathy:      Cervical: No cervical adenopathy.   Skin:     General: Skin is warm and dry.          Neurological:      Mental Status: He is alert and oriented to person, place, and time.      Cranial Nerves: No cranial nerve deficit.      Coordination: Coordination normal.      Deep Tendon Reflexes: Reflexes are normal and symmetric. Reflexes normal.   Psychiatric:         Behavior: Behavior normal.         Thought Content: Thought content normal.         Judgment: Judgment normal.         Procedures           ED Course  ED Course as of 06/29/25 1121   Sun Jun 29, 2025   1121 Dressing changes done by myself.  Immunizations are up-to-date.  Discussed dressing changes twice daily and these look these get some  air to them so they can continue to heal [CAL]      ED Course User Index  [CAL] Marquise Graves PA                                                       Medical Decision Making  Problems Addressed:  Abrasion of left calf, subsequent encounter: acute illness or injury  Abrasion of left forearm, subsequent encounter: acute illness or injury    Risk  OTC drugs.        Final diagnoses:   Abrasion of left forearm, subsequent encounter   Abrasion of left calf, subsequent encounter       ED Disposition  ED Disposition       ED Disposition   Discharge    Condition   Stable    Comment   --               No follow-up provider specified.       Medication List      No changes were made to your prescriptions during this visit.            Marquise Graves PA  06/30/25 9147